# Patient Record
Sex: MALE | Race: WHITE | NOT HISPANIC OR LATINO
[De-identification: names, ages, dates, MRNs, and addresses within clinical notes are randomized per-mention and may not be internally consistent; named-entity substitution may affect disease eponyms.]

---

## 2017-06-26 ENCOUNTER — TRANSCRIPTION ENCOUNTER (OUTPATIENT)
Age: 62
End: 2017-06-26

## 2017-07-11 ENCOUNTER — TRANSCRIPTION ENCOUNTER (OUTPATIENT)
Age: 62
End: 2017-07-11

## 2019-10-09 ENCOUNTER — INBOUND DOCUMENT (OUTPATIENT)
Age: 64
End: 2019-10-09

## 2021-07-05 ENCOUNTER — TRANSCRIPTION ENCOUNTER (OUTPATIENT)
Age: 66
End: 2021-07-05

## 2021-12-09 ENCOUNTER — TRANSCRIPTION ENCOUNTER (OUTPATIENT)
Age: 66
End: 2021-12-09

## 2022-06-06 ENCOUNTER — NON-APPOINTMENT (OUTPATIENT)
Age: 67
End: 2022-06-06

## 2022-08-25 ENCOUNTER — NON-APPOINTMENT (OUTPATIENT)
Age: 67
End: 2022-08-25

## 2022-11-23 ENCOUNTER — OFFICE (OUTPATIENT)
Dept: URBAN - METROPOLITAN AREA CLINIC 114 | Facility: CLINIC | Age: 67
Setting detail: OPHTHALMOLOGY
End: 2022-11-23
Payer: COMMERCIAL

## 2022-11-23 DIAGNOSIS — H25.13: ICD-10-CM

## 2022-11-23 DIAGNOSIS — H40.013: ICD-10-CM

## 2022-11-23 PROCEDURE — 99213 OFFICE O/P EST LOW 20 MIN: CPT | Performed by: SPECIALIST

## 2022-11-23 PROCEDURE — 92020 GONIOSCOPY: CPT | Performed by: SPECIALIST

## 2022-11-23 PROCEDURE — 92083 EXTENDED VISUAL FIELD XM: CPT | Performed by: SPECIALIST

## 2022-11-23 ASSESSMENT — TONOMETRY
OS_IOP_MMHG: 19
OD_IOP_MMHG: 18

## 2022-11-23 ASSESSMENT — PACHYMETRY
OS_CT_UM: 582
OS_CT_CORRECTION: -3
OD_CT_UM: 594
OD_CT_CORRECTION: -4

## 2022-11-23 ASSESSMENT — VISUAL ACUITY
OS_BCVA: 20/20
OD_BCVA: 20/20

## 2022-11-23 ASSESSMENT — CONFRONTATIONAL VISUAL FIELD TEST (CVF)
OD_FINDINGS: FULL
OS_FINDINGS: FULL

## 2023-01-19 ENCOUNTER — INPATIENT (INPATIENT)
Facility: HOSPITAL | Age: 68
LOS: 4 days | Discharge: HOME | DRG: 641 | End: 2023-01-24
Attending: INTERNAL MEDICINE | Admitting: HOSPITALIST
Payer: COMMERCIAL

## 2023-01-19 VITALS
TEMPERATURE: 99 F | DIASTOLIC BLOOD PRESSURE: 75 MMHG | WEIGHT: 164.91 LBS | HEART RATE: 64 BPM | OXYGEN SATURATION: 100 % | SYSTOLIC BLOOD PRESSURE: 133 MMHG | RESPIRATION RATE: 18 BRPM | HEIGHT: 69 IN

## 2023-01-19 DIAGNOSIS — R55 SYNCOPE AND COLLAPSE: ICD-10-CM

## 2023-01-19 LAB
ABO RH CONFIRMATION: SIGNIFICANT CHANGE UP
ADD ON TEST-SPECIMEN IN LAB: SIGNIFICANT CHANGE UP
ALBUMIN SERPL ELPH-MCNC: 3.7 G/DL — SIGNIFICANT CHANGE UP (ref 3.3–5)
ALP SERPL-CCNC: 72 U/L — SIGNIFICANT CHANGE UP (ref 40–120)
ALT FLD-CCNC: 38 U/L — SIGNIFICANT CHANGE UP (ref 12–78)
ANION GAP SERPL CALC-SCNC: 8 MMOL/L — SIGNIFICANT CHANGE UP (ref 5–17)
APPEARANCE UR: ABNORMAL
APTT BLD: 26.1 SEC — LOW (ref 27.5–35.5)
AST SERPL-CCNC: 33 U/L — SIGNIFICANT CHANGE UP (ref 15–37)
BACTERIA # UR AUTO: NEGATIVE — SIGNIFICANT CHANGE UP
BASOPHILS # BLD AUTO: 0.02 K/UL — SIGNIFICANT CHANGE UP (ref 0–0.2)
BASOPHILS NFR BLD AUTO: 0.2 % — SIGNIFICANT CHANGE UP (ref 0–2)
BILIRUB SERPL-MCNC: 0.9 MG/DL — SIGNIFICANT CHANGE UP (ref 0.2–1.2)
BILIRUB UR-MCNC: NEGATIVE — SIGNIFICANT CHANGE UP
BLD GP AB SCN SERPL QL: SIGNIFICANT CHANGE UP
BUN SERPL-MCNC: 28 MG/DL — HIGH (ref 7–23)
CALCIUM SERPL-MCNC: 9.1 MG/DL — SIGNIFICANT CHANGE UP (ref 8.5–10.1)
CHLORIDE SERPL-SCNC: 92 MMOL/L — LOW (ref 96–108)
CO2 SERPL-SCNC: 27 MMOL/L — SIGNIFICANT CHANGE UP (ref 22–31)
COLOR SPEC: ABNORMAL
CREAT SERPL-MCNC: 1.76 MG/DL — HIGH (ref 0.5–1.3)
DIFF PNL FLD: ABNORMAL
EGFR: 42 ML/MIN/1.73M2 — LOW
EOSINOPHIL # BLD AUTO: 0 K/UL — SIGNIFICANT CHANGE UP (ref 0–0.5)
EOSINOPHIL NFR BLD AUTO: 0 % — SIGNIFICANT CHANGE UP (ref 0–6)
EPI CELLS # UR: NEGATIVE — SIGNIFICANT CHANGE UP
FLUAV AG NPH QL: SIGNIFICANT CHANGE UP
FLUBV AG NPH QL: SIGNIFICANT CHANGE UP
GLUCOSE SERPL-MCNC: 239 MG/DL — HIGH (ref 70–99)
GLUCOSE UR QL: NEGATIVE — SIGNIFICANT CHANGE UP
HCT VFR BLD CALC: 35.8 % — LOW (ref 39–50)
HGB BLD-MCNC: 12.6 G/DL — LOW (ref 13–17)
IMM GRANULOCYTES NFR BLD AUTO: 0.5 % — SIGNIFICANT CHANGE UP (ref 0–0.9)
INR BLD: 1.45 RATIO — HIGH (ref 0.88–1.16)
KETONES UR-MCNC: ABNORMAL
LACTATE SERPL-SCNC: 1.5 MMOL/L — SIGNIFICANT CHANGE UP (ref 0.7–2)
LACTATE SERPL-SCNC: 2.2 MMOL/L — HIGH (ref 0.7–2)
LEUKOCYTE ESTERASE UR-ACNC: ABNORMAL
LYMPHOCYTES # BLD AUTO: 0.13 K/UL — LOW (ref 1–3.3)
LYMPHOCYTES # BLD AUTO: 1.2 % — LOW (ref 13–44)
MAGNESIUM SERPL-MCNC: 2 MG/DL — SIGNIFICANT CHANGE UP (ref 1.6–2.6)
MANUAL SMEAR VERIFICATION: SIGNIFICANT CHANGE UP
MCHC RBC-ENTMCNC: 31.2 PG — SIGNIFICANT CHANGE UP (ref 27–34)
MCHC RBC-ENTMCNC: 35.2 GM/DL — SIGNIFICANT CHANGE UP (ref 32–36)
MCV RBC AUTO: 88.6 FL — SIGNIFICANT CHANGE UP (ref 80–100)
MONOCYTES # BLD AUTO: 0.19 K/UL — SIGNIFICANT CHANGE UP (ref 0–0.9)
MONOCYTES NFR BLD AUTO: 1.7 % — LOW (ref 2–14)
NEUTROPHILS # BLD AUTO: 10.58 K/UL — HIGH (ref 1.8–7.4)
NEUTROPHILS NFR BLD AUTO: 96.4 % — HIGH (ref 43–77)
NITRITE UR-MCNC: NEGATIVE — SIGNIFICANT CHANGE UP
NT-PROBNP SERPL-SCNC: 366 PG/ML — HIGH (ref 0–125)
PH UR: 5 — SIGNIFICANT CHANGE UP (ref 5–8)
PLAT MORPH BLD: NORMAL — SIGNIFICANT CHANGE UP
PLATELET # BLD AUTO: 260 K/UL — SIGNIFICANT CHANGE UP (ref 150–400)
POTASSIUM SERPL-MCNC: 4 MMOL/L — SIGNIFICANT CHANGE UP (ref 3.5–5.3)
POTASSIUM SERPL-SCNC: 4 MMOL/L — SIGNIFICANT CHANGE UP (ref 3.5–5.3)
PROT SERPL-MCNC: 7.3 GM/DL — SIGNIFICANT CHANGE UP (ref 6–8.3)
PROT UR-MCNC: 100
PROTHROM AB SERPL-ACNC: 16.9 SEC — HIGH (ref 10.5–13.4)
RBC # BLD: 4.04 M/UL — LOW (ref 4.2–5.8)
RBC # FLD: 11.9 % — SIGNIFICANT CHANGE UP (ref 10.3–14.5)
RBC BLD AUTO: NORMAL — SIGNIFICANT CHANGE UP
RBC CASTS # UR COMP ASSIST: >50 /HPF (ref 0–4)
RSV RNA NPH QL NAA+NON-PROBE: SIGNIFICANT CHANGE UP
SARS-COV-2 RNA SPEC QL NAA+PROBE: SIGNIFICANT CHANGE UP
SODIUM SERPL-SCNC: 127 MMOL/L — LOW (ref 135–145)
SP GR SPEC: 1 — LOW (ref 1.01–1.02)
TROPONIN I, HIGH SENSITIVITY RESULT: 11.98 NG/L — SIGNIFICANT CHANGE UP
TROPONIN I, HIGH SENSITIVITY RESULT: 8.03 NG/L — SIGNIFICANT CHANGE UP
UROBILINOGEN FLD QL: NEGATIVE — SIGNIFICANT CHANGE UP
WBC # BLD: 10.97 K/UL — HIGH (ref 3.8–10.5)
WBC # FLD AUTO: 10.97 K/UL — HIGH (ref 3.8–10.5)
WBC UR QL: SIGNIFICANT CHANGE UP /HPF (ref 0–5)

## 2023-01-19 PROCEDURE — 83605 ASSAY OF LACTIC ACID: CPT

## 2023-01-19 PROCEDURE — 71045 X-RAY EXAM CHEST 1 VIEW: CPT | Mod: 26

## 2023-01-19 PROCEDURE — 87086 URINE CULTURE/COLONY COUNT: CPT

## 2023-01-19 PROCEDURE — 85027 COMPLETE CBC AUTOMATED: CPT

## 2023-01-19 PROCEDURE — 83735 ASSAY OF MAGNESIUM: CPT

## 2023-01-19 PROCEDURE — 80048 BASIC METABOLIC PNL TOTAL CA: CPT

## 2023-01-19 PROCEDURE — 72170 X-RAY EXAM OF PELVIS: CPT | Mod: 26

## 2023-01-19 PROCEDURE — 70486 CT MAXILLOFACIAL W/O DYE: CPT | Mod: 26,MA

## 2023-01-19 PROCEDURE — 70450 CT HEAD/BRAIN W/O DYE: CPT | Mod: 26,MA

## 2023-01-19 PROCEDURE — 86803 HEPATITIS C AB TEST: CPT

## 2023-01-19 PROCEDURE — 99285 EMERGENCY DEPT VISIT HI MDM: CPT

## 2023-01-19 PROCEDURE — 93010 ELECTROCARDIOGRAM REPORT: CPT

## 2023-01-19 PROCEDURE — 84484 ASSAY OF TROPONIN QUANT: CPT

## 2023-01-19 PROCEDURE — 76770 US EXAM ABDO BACK WALL COMP: CPT

## 2023-01-19 PROCEDURE — 36415 COLL VENOUS BLD VENIPUNCTURE: CPT

## 2023-01-19 PROCEDURE — 83036 HEMOGLOBIN GLYCOSYLATED A1C: CPT

## 2023-01-19 PROCEDURE — 81001 URINALYSIS AUTO W/SCOPE: CPT

## 2023-01-19 PROCEDURE — 99223 1ST HOSP IP/OBS HIGH 75: CPT

## 2023-01-19 PROCEDURE — 76376 3D RENDER W/INTRP POSTPROCES: CPT | Mod: 26

## 2023-01-19 PROCEDURE — 72125 CT NECK SPINE W/O DYE: CPT | Mod: 26,MA

## 2023-01-19 RX ORDER — LANOLIN ALCOHOL/MO/W.PET/CERES
3 CREAM (GRAM) TOPICAL AT BEDTIME
Refills: 0 | Status: DISCONTINUED | OUTPATIENT
Start: 2023-01-19 | End: 2023-01-24

## 2023-01-19 RX ORDER — METOPROLOL TARTRATE 50 MG
100 TABLET ORAL
Refills: 0 | Status: DISCONTINUED | OUTPATIENT
Start: 2023-01-19 | End: 2023-01-24

## 2023-01-19 RX ORDER — ONDANSETRON 8 MG/1
4 TABLET, FILM COATED ORAL EVERY 8 HOURS
Refills: 0 | Status: DISCONTINUED | OUTPATIENT
Start: 2023-01-19 | End: 2023-01-24

## 2023-01-19 RX ORDER — SODIUM CHLORIDE 9 MG/ML
1000 INJECTION INTRAMUSCULAR; INTRAVENOUS; SUBCUTANEOUS
Refills: 0 | Status: DISCONTINUED | OUTPATIENT
Start: 2023-01-19 | End: 2023-01-21

## 2023-01-19 RX ORDER — CEFTRIAXONE 500 MG/1
1000 INJECTION, POWDER, FOR SOLUTION INTRAMUSCULAR; INTRAVENOUS ONCE
Refills: 0 | Status: COMPLETED | OUTPATIENT
Start: 2023-01-19 | End: 2023-01-19

## 2023-01-19 RX ORDER — CEFTRIAXONE 500 MG/1
INJECTION, POWDER, FOR SOLUTION INTRAMUSCULAR; INTRAVENOUS
Refills: 0 | Status: DISCONTINUED | OUTPATIENT
Start: 2023-01-19 | End: 2023-01-21

## 2023-01-19 RX ORDER — SODIUM CHLORIDE 9 MG/ML
3 INJECTION INTRAMUSCULAR; INTRAVENOUS; SUBCUTANEOUS ONCE
Refills: 0 | Status: COMPLETED | OUTPATIENT
Start: 2023-01-19 | End: 2023-01-19

## 2023-01-19 RX ORDER — CEFTRIAXONE 500 MG/1
1000 INJECTION, POWDER, FOR SOLUTION INTRAMUSCULAR; INTRAVENOUS EVERY 24 HOURS
Refills: 0 | Status: DISCONTINUED | OUTPATIENT
Start: 2023-01-20 | End: 2023-01-21

## 2023-01-19 RX ORDER — INFLUENZA VIRUS VACCINE 15; 15; 15; 15 UG/.5ML; UG/.5ML; UG/.5ML; UG/.5ML
0.7 SUSPENSION INTRAMUSCULAR ONCE
Refills: 0 | Status: COMPLETED | OUTPATIENT
Start: 2023-01-19 | End: 2023-01-19

## 2023-01-19 RX ORDER — VALSARTAN 80 MG/1
320 TABLET ORAL DAILY
Refills: 0 | Status: DISCONTINUED | OUTPATIENT
Start: 2023-01-20 | End: 2023-01-24

## 2023-01-19 RX ORDER — ACETAMINOPHEN 500 MG
650 TABLET ORAL EVERY 6 HOURS
Refills: 0 | Status: DISCONTINUED | OUTPATIENT
Start: 2023-01-19 | End: 2023-01-24

## 2023-01-19 RX ADMIN — CEFTRIAXONE 1000 MILLIGRAM(S): 500 INJECTION, POWDER, FOR SOLUTION INTRAMUSCULAR; INTRAVENOUS at 22:31

## 2023-01-19 RX ADMIN — SODIUM CHLORIDE 3 MILLILITER(S): 9 INJECTION INTRAMUSCULAR; INTRAVENOUS; SUBCUTANEOUS at 15:34

## 2023-01-19 RX ADMIN — SODIUM CHLORIDE 75 MILLILITER(S): 9 INJECTION INTRAMUSCULAR; INTRAVENOUS; SUBCUTANEOUS at 22:32

## 2023-01-19 NOTE — ED PROVIDER NOTE - CLINICAL SUMMARY MEDICAL DECISION MAKING FREE TEXT BOX
66 y/o M PMHx of afib not on AC med but +AS-81, recently on bactrim DS presents to the ED for dysuria with gross hematuria, UTI dx at  four days ago, BIBA s/p syncopal episode witnessed by wife incurred while walking to  outpatient office. Positive worsening hematuria including passing clots prior to leaving house, +ecchymosis superificial scratches R lateral periorbital area. Plan: NA: CT head face, CT C-spine, CXR, XR pelvis. Syncope workup: EKG, syncope labs, including serial trop, coags, cardiac monitor, fall precautions. Infection workup: cultures including urine and serum lactate. Void trial with postvoid bladder scan. If pt unable to pass urine 3 way desir for CVI. monitor, observe, reassess. Expect tele-admission for syncope with inpatient  consult. 68 y/o M PMHx of afib not on AC med but +AS-81, recently on bactrim DS presents to the ED for dysuria with gross hematuria, UTI dx at  four days ago, BIBA s/p syncopal episode witnessed by wife incurred while walking to  outpatient office. Positive worsening hematuria including passing clots prior to leaving house, +ecchymosis superificial scratches R lateral periorbital area.   Plan: NA: CT head face, CT C-spine, CXR, XR pelvis. Syncope workup: EKG, syncope labs, including serial trop, coags, cardiac monitor, fall precautions. Infection workup: cultures including urine and serum lactate. Void trial with postvoid bladder scan. If pt unable to pass urine 3 way desir for CVI. monitor, observe, reassess. Expect tele-admission for syncope with inpatient  consult.    See Progress Notes for updates on management. 68 y/o M PMHx of AFib not on AC med but +ASA-81, recently on Bactrim DS for dysuria with gross hematuria, UTI dx at  four days ago, BIBA s/p syncopal episode witnessed by wife incurred while walking to  outpatient office. Positive worsening hematuria including passing clots prior to leaving house, +ecchymosis & superficial scratches R lateral periorbital area.   Plan: Neuro Alert: CT head, face, & C-spine. CXR, XR pelvis. Syncope workup: EKG, syncope labs, including serial trop, coags, cardiac monitor, fall precautions. Infection workup: cultures including urine and serum lactate. Void trial with postvoid bladder scan. If pt unable to pass urine 3 way desir for CBI. monitor, observe, reassess. Expect tele-admission for syncope with inpatient  consult.    See Progress Notes for updates on management & case progression.

## 2023-01-19 NOTE — ED ADULT NURSE NOTE - OBJECTIVE STATEMENT
patient BIBEMS s/p syncopal episode. pt reports that he went to his Urologist, got out of his car, felt dizzy and does not remember passing out. +headstrike to right side of head, abrasion noted to right side of head  -blood thinners, +LOC,  MD Munguia called at bedside Neuro Alert called at 2:15 PM, pt denies numbness tingling, HA, Blurry vision, double vision, Pt sent to [facility]

## 2023-01-19 NOTE — ED PROVIDER NOTE - CARDIAC, MLM
Regular rate, rhythm, and normal radial pulses. Regular rate, rhythm, and normal radial pulses.  Normal radial pulse.

## 2023-01-19 NOTE — PATIENT PROFILE ADULT - FALL HARM RISK - HARM RISK INTERVENTIONS

## 2023-01-19 NOTE — ED PROVIDER NOTE - MUSCULOSKELETAL, MLM
Neck non-tender and supple w/o pain. Back, chest wall, pelvis non- tender and stable. MAEx4, no focal extremity deformity, tenderness, or swelling. No external signs of trauma other than abrasion to right periorbital area. Neck non-tender and supple w/o pain. Back, chest wall, pelvis nontender and stable. MAEx4, no focal extremity deformity, tenderness, or swelling.

## 2023-01-19 NOTE — ED ADULT NURSE NOTE - CHIEF COMPLAINT QUOTE
patient BIBEMS s/p syncopal episode. pt reports that he went to his Urologist, got out of his car, felt dizzy and does not remember passing out. +headstrike to right side of head, abrasion noted to right side of head  -blood thinners, +LOC,  MD Munguia called at bedside Neuro Alert called at 2:15 PM, pt denies numbness tingling, HA, Blurry vision, double vision, Pt sent to [facility]

## 2023-01-19 NOTE — H&P ADULT - ASSESSMENT
66 yo male with PMH of HTN, PAF on asa admitted with:    #gross hematuria - possible hemorrhagic cystitis  #acute blood loss anemia  - admit to tele  - IV abx - ceftriaxone  - follow urine culture  - CBI in place  - urology consult  - renal US ordered  - will need eventual cystoscopy   - hold asa  - monitor H/H    #syncope - likely hypovolemic secondary to above  - tele monitoring  - troponins negative x2  - imagining negative for acute pathology    #ZOILA  - unknown baseline Cr  - hold HCTZ  - IV fluids  - continue valsartan for now, if Cr worsening will need to stop    #Hyponatremia  - hold HCTZ  - fluids  - repeat BMP in AM    #HTN  - continue valsartan and metoprolol  - HCTZ held    #DVT prophylaxis  - SCDs    #Advance Directives  - FULL CODE, d/w patient    wife Adams called and updated on plan of care

## 2023-01-19 NOTE — ED PROVIDER NOTE - GASTROINTESTINAL, MLM
Bowel sounds hypoactive, normal pitch. Soft, nontender positive suprapubic mass suspected of bladder, non-tender. Bowel sounds hypoactive, normal pitch. Soft, nontender, positive suprapubic mass suspected of bladder, non-tender.

## 2023-01-19 NOTE — PATIENT PROFILE ADULT - PATIENT REPRESENTATIVE: ( YOU CAN CHOOSE ANY PERSON THAT CAN ASSIST YOU WITH YOUR HEALTH CARE PREFERENCES, DOES NOT HAVE TO BE A SPOUSE, IMMEDIATE FAMILY OR SIGNIFICANT OTHER/PARTNER)
Billing: [id number] (Unlisted special dermatological service or procedure) Billing: [id number] (Unlisted special dermatological service or procedure) declines

## 2023-01-19 NOTE — ED ADULT NURSE NOTE - ED STAT RN HANDOFF DETAILS
Received pt endorsed from Zenobia Holbrook, evaluated for syncopal episode, pt had desir placed by previous RN, with CBI initiated for hematuria, bright red blood, pt in NAD, c/o of some pressure to bladder still, pt TBA, placed on cardiac/resp monitoring

## 2023-01-19 NOTE — ED PROVIDER NOTE - EYES, MLM
PERRL, EOMI. Mild ecchymotic discoloration to the right infraorbital with associated superficial lateral scratch, unable to separate edges of the wound. Right orbital ridge non-tender, no step off deformity. No other clinical evidence of facial injury, PERRL, EOMI. Mild ecchymotic discoloration to the right infraorbital with associated superficial lateral scratch, unable to separate edges of the wound. Right orbital ridge non-tender, no step-off deformity. No other clinical evidence of facial injury,

## 2023-01-19 NOTE — ED PROVIDER NOTE - SKIN, MLM
Skin normal color for race, warm, dry and intact. No evidence of rash. Skin normal color for race, warm, dry and intact. No evidence of rash.  No external signs of trauma other than abrasion to right periorbital area.

## 2023-01-19 NOTE — ED PROVIDER NOTE - OBJECTIVE STATEMENT
68 y/o male with PMHx of A-fib years ago on baby aspirin and HTN on hctz, valsartan, metoprolol BIBEMS to the ED s/p syncopal episode. Pt reports he drove to his urologist appointment today, got out of his car and suddenly felt dizzy, weak, and lightheaded at around 1:25PM. Apparent LOC involved and likely syncope, however does not remember passing out. LOC < 30 seconds, denies any seizure activity.  Pt woke up on the floor of the parking lot, episode witnessed by wife. + Headstrike to right side of the head, endorsing abrasion ro right face periorbital area. Not on AC. Pt denies HA, numbness/tingling, blurry or double vision. History via pt and wife. On daily aspirin. Pt had blood in urine on Monday, was diagnosed with UTI by  bactrim DS b.i.d. Gross hematuria improved subsequently but second urination more bloody with clots passed.   Urology: [place] (Dr. Sorenson)  PCP: Dr. Duarte  Cardiologist: Dr. Mclean, [place] 66 y/o male with PMHx of A-fib years ago on baby aspirin and HTN on hctz, valsartan, metoprolol BIBEMS to the ED s/p syncopal episode. Pt reports he drove to his urologist appointment today, got out of his car and suddenly felt dizzy, weak, and lightheaded at around 1:25PM. Apparent LOC involved and likely syncope, however does not remember passing out. LOC < 30 seconds, + witnessed by wife: denies any seizure activity.  Pt woke up on the floor of the parking lot. + Head strike to right side of the head, endorsing abrasion ro right face/periorbital area. Not on AC. Pt denies HA, numbness/tingling, blurry or double vision. History via pt and wife. On daily aspirin. Pt had blood in urine on Monday, was diagnosed with UTI by UC & started on Bactrim DS b.i.d. Gross hematuria improved subsequently but second urination today + more bloody with clots passed, prompting GI office eval.  Urology: [place] (Dr. Sorenson)  PCP: Dr. Duarte  Cardiologist: Dr. Mclean, [place]

## 2023-01-19 NOTE — ED PROVIDER NOTE - WET READ LAUNCH FT
There are no Wet Read(s) to document.
[id number] [place] [address]  Phone: [phone]  Fax:

## 2023-01-19 NOTE — ED ADULT TRIAGE NOTE - CHIEF COMPLAINT QUOTE
Cardiac
patient BIBEMS s/p syncopal episode. pt reports that he went to his Urologist, got out of his car, felt dizzy and does not remember passing out. +headstrike to right side of head, abrasion noted to right side of head  -blood thinners, +LOC,  MD Munguia called at bedside Neuro Alert called at 2:15 PM, pt denies numbness tingling, HA, Blurry vision, double vision, Pt sent to [facility]

## 2023-01-19 NOTE — H&P ADULT - NSHPPHYSICALEXAM_GEN_ALL_CORE
Vital Signs Last 24 Hrs  T(C): 36.9 (19 Jan 2023 18:11), Max: 37 (19 Jan 2023 14:22)  T(F): 98.5 (19 Jan 2023 18:11), Max: 98.6 (19 Jan 2023 14:22)  HR: 73 (19 Jan 2023 19:24) (62 - 73)  BP: 113/80 (19 Jan 2023 19:24) (113/80 - 133/75)  BP(mean): --  RR: 19 (19 Jan 2023 19:24) (17 - 19)  SpO2: 98% (19 Jan 2023 19:24) (98% - 100%)    Parameters below as of 19 Jan 2023 19:24  Patient On (Oxygen Delivery Method): room air

## 2023-01-19 NOTE — ED PROVIDER NOTE - PROGRESS NOTE DETAILS
Lesly Baker for attending Dr. Munguia: I Sara Baker attest that this documentation has been prepared under the direction and in the presence of Doctor Munguia. JONATHON Munguia MD:  Pt unable to self-void, ED RN inserted 3-camilla Cooper with + clots & dark red hematuria return.  Manual irrigation: much clots removed, still with gross hematuria.  CBI started.  Labs resulted.  Hospitalist called for Tele admission re: syncope --> no answer, phone message left. JONATHON Munguia MD:  Dr. Murray aware of Tele admission, + JESÚS status. Lesly Baker for attending Dr. Munguia: ASA contraindicated due to active blood in urine. C MD Nilda:  Pt unable to self-void, ED RN inserted 3-way Cooper with + clots & dark red hematuria return.  Manual irrigation: much clots removed, still with gross hematuria.  CBI started.  Labs resulted.  Hospitalist called for Tele admission re: syncope --> no answer, phone message left.

## 2023-01-19 NOTE — H&P ADULT - HISTORY OF PRESENT ILLNESS
66 yo male with PMH of HTN, PAF on asa presents to the ED with hematuria and syncope. Pt states for the past week he has noticed blood in his urine. He went to urgent care on Monday and was prescribed bactrim which he was taking. He noticed worsening hematuria so he made an appt with his urologist Dr. Sorenson today. As he arrived to the office and was getting out of the car he felt lightheaded and fell to the ground. It was witnessed by his wife Adams. He did have LOC and head trauma to the right side of his head. No seizure like activity. Denies any CP, SOB, palpitations. He states he did pass out one other time in his life many years ago. He follows cardio Dr. Tatum and last saw him in September with a normal echo and stress test as per patient. Pt denies any dysuria or fevers. No abd pain. He did notice some blood clots earlier in his urine.   In the ED patient was started on a CBI. Urine still dark red but no clots noted in desir.

## 2023-01-19 NOTE — ED PROVIDER NOTE - NEUROLOGICAL, MLM
A&Ox3, CN2-12 intact, normal speech, no focal motor sensory deficits. A&Ox3, CNs 2-12 intact, normal speech, no focal motor sensory deficits.

## 2023-01-20 DIAGNOSIS — R33.8 OTHER RETENTION OF URINE: ICD-10-CM

## 2023-01-20 LAB
A1C WITH ESTIMATED AVERAGE GLUCOSE RESULT: 6.1 % — HIGH (ref 4–5.6)
ANION GAP SERPL CALC-SCNC: 9 MMOL/L — SIGNIFICANT CHANGE UP (ref 5–17)
BUN SERPL-MCNC: 20 MG/DL — SIGNIFICANT CHANGE UP (ref 7–23)
CALCIUM SERPL-MCNC: 8.7 MG/DL — SIGNIFICANT CHANGE UP (ref 8.5–10.1)
CHLORIDE SERPL-SCNC: 95 MMOL/L — LOW (ref 96–108)
CO2 SERPL-SCNC: 26 MMOL/L — SIGNIFICANT CHANGE UP (ref 22–31)
CREAT SERPL-MCNC: 0.95 MG/DL — SIGNIFICANT CHANGE UP (ref 0.5–1.3)
EGFR: 88 ML/MIN/1.73M2 — SIGNIFICANT CHANGE UP
ESTIMATED AVERAGE GLUCOSE: 128 MG/DL — HIGH (ref 68–114)
GLUCOSE SERPL-MCNC: 145 MG/DL — HIGH (ref 70–99)
HCT VFR BLD CALC: 32.5 % — LOW (ref 39–50)
HCV AB S/CO SERPL IA: 0.08 S/CO — SIGNIFICANT CHANGE UP (ref 0–0.99)
HCV AB SERPL-IMP: SIGNIFICANT CHANGE UP
HGB BLD-MCNC: 11.8 G/DL — LOW (ref 13–17)
MCHC RBC-ENTMCNC: 31.3 PG — SIGNIFICANT CHANGE UP (ref 27–34)
MCHC RBC-ENTMCNC: 36.3 GM/DL — HIGH (ref 32–36)
MCV RBC AUTO: 86.2 FL — SIGNIFICANT CHANGE UP (ref 80–100)
PLATELET # BLD AUTO: 215 K/UL — SIGNIFICANT CHANGE UP (ref 150–400)
POTASSIUM SERPL-MCNC: 3.3 MMOL/L — LOW (ref 3.5–5.3)
POTASSIUM SERPL-SCNC: 3.3 MMOL/L — LOW (ref 3.5–5.3)
RBC # BLD: 3.77 M/UL — LOW (ref 4.2–5.8)
RBC # FLD: 12 % — SIGNIFICANT CHANGE UP (ref 10.3–14.5)
SODIUM SERPL-SCNC: 130 MMOL/L — LOW (ref 135–145)
TROPONIN I, HIGH SENSITIVITY RESULT: 10.54 NG/L — SIGNIFICANT CHANGE UP
WBC # BLD: 11.44 K/UL — HIGH (ref 3.8–10.5)
WBC # FLD AUTO: 11.44 K/UL — HIGH (ref 3.8–10.5)

## 2023-01-20 PROCEDURE — 99233 SBSQ HOSP IP/OBS HIGH 50: CPT

## 2023-01-20 PROCEDURE — 76770 US EXAM ABDO BACK WALL COMP: CPT | Mod: 26

## 2023-01-20 RX ORDER — POTASSIUM CHLORIDE 20 MEQ
40 PACKET (EA) ORAL ONCE
Refills: 0 | Status: COMPLETED | OUTPATIENT
Start: 2023-01-20 | End: 2023-01-20

## 2023-01-20 RX ADMIN — Medication 100 MILLIGRAM(S): at 21:04

## 2023-01-20 RX ADMIN — Medication 100 MILLIGRAM(S): at 09:59

## 2023-01-20 RX ADMIN — Medication 40 MILLIEQUIVALENT(S): at 13:09

## 2023-01-20 RX ADMIN — CEFTRIAXONE 1000 MILLIGRAM(S): 500 INJECTION, POWDER, FOR SOLUTION INTRAMUSCULAR; INTRAVENOUS at 21:05

## 2023-01-20 RX ADMIN — VALSARTAN 320 MILLIGRAM(S): 80 TABLET ORAL at 09:59

## 2023-01-20 NOTE — PROGRESS NOTE ADULT - ASSESSMENT
66 yo male with PMH of HTN, PAF on asa presents to the ED with hematuria and syncope.    #gross hematuria, w clot retention, c/b urinary retention, acute blood loss anemia, post renal ZOILA  -monitor Hgb, now stable  -Cr downtrending  -UCx pending  -continue CTX while awaiting UCx  -hold ASA  -CBI per urology, desir/CBI per their recs  -f/u urology recs (Nanette)    #syncope - likely hypovolemic secondary to above  -tele x 24hrs  -orthostatics ordered  -trops neg  -CTH neg    #Hyponatremia  -improving  -trend  -IVF  -hold HCTZ    #lactic acidosis  -improved w IVF    #HTN  - continue valsartan and metoprolol  - HCTZ held    #DVT prophylaxis  - SCDs    #Advance Directives  - FULL CODE, d/w patient    Wife updated at bedside

## 2023-01-20 NOTE — PROGRESS NOTE ADULT - SUBJECTIVE AND OBJECTIVE BOX
HOSPITALIST ATTENDING PROGRESS NOTE    Chart and meds reviewed.  Patient seen and examined.    CC: hematuria    Subjective: Denies abdnl pain, SOB, CP, dizziness.    All other systems reviewed and found to be negative with the exception of what has been described above.    MEDICATIONS  (STANDING):  cefTRIAXone Injectable.      cefTRIAXone Injectable. 1000 milliGRAM(s) IV Push every 24 hours  influenza  Vaccine (HIGH DOSE) 0.7 milliLiter(s) IntraMuscular once  metoprolol tartrate 100 milliGRAM(s) Oral two times a day  sodium chloride 0.9%. 1000 milliLiter(s) (75 mL/Hr) IV Continuous <Continuous>  valsartan 320 milliGRAM(s) Oral daily    MEDICATIONS  (PRN):  acetaminophen     Tablet .. 650 milliGRAM(s) Oral every 6 hours PRN Temp greater or equal to 38C (100.4F), Mild Pain (1 - 3)  aluminum hydroxide/magnesium hydroxide/simethicone Suspension 30 milliLiter(s) Oral every 4 hours PRN Dyspepsia  melatonin 3 milliGRAM(s) Oral at bedtime PRN Insomnia  ondansetron Injectable 4 milliGRAM(s) IV Push every 8 hours PRN Nausea and/or Vomiting      VITALS:  T(F): 99.8 (23 @ 19:45), Max: 99.8 (23 @ 19:45)  HR: 81 (23 @ 19:45) (81 - 98)  BP: 119/71 (23 @ 19:45) (119/71 - 168/75)  RR: 19 (23 @ 19:45) (18 - 19)  SpO2: 98% (23 @ 19:45) (97% - 98%)  Wt(kg): --    I&O's Summary    2023 07:01  -  2023 07:00  --------------------------------------------------------  IN: 5500 mL / OUT: 9100 mL / NET: -3600 mL    2023 07:01  -  2023 21:17  --------------------------------------------------------  IN: 3000 mL / OUT: 3750 mL / NET: -750 mL        CAPILLARY BLOOD GLUCOSE          PHYSICAL EXAM:  Gen: NAD  HEENT:  pupils equal and reactive, EOMI, no oropharyngeal lesions, erythema, exudates, oral thrush  NECK:   supple, no carotid bruits, no palpable lymph nodes, no thyromegaly  CV:  +S1, +S2, regular, no murmurs or rubs  RESP:   lungs clear to auscultation bilaterally, no wheezing, rales, rhonchi, good air entry bilaterally  BREAST:  not examined  GI:  abdomen soft, non-tender, non-distended, normal BS, no bruits, no abdominal masses, no palpable masses  RECTAL:  not examined  :  not examined, desir to CBI  MSK:   normal muscle tone, no atrophy, no rigidity, no contractions  EXT:  no clubbing, no cyanosis, no edema, no calf pain, swelling or erythema  VASCULAR:  pulses equal and symmetric in the upper and lower extremities  NEURO:  AAOX3, no focal neurological deficits, follows all commands, able to move extremities spontaneously  SKIN:  no ulcers, lesions or rashes    LABS:                            11.8   11.44 )-----------( 215      ( 2023 07:27 )             32.5     01-    130<L>  |  95<L>  |  20  ----------------------------<  145<H>  3.3<L>   |  26  |  0.95    Ca    8.7      2023 07:27  Mg     2.0         TPro  7.3  /  Alb  3.7  /  TBili  0.9  /  DBili  x   /  AST  33  /  ALT  38  /  AlkPhos  72          LIVER FUNCTIONS - ( 2023 15:25 )  Alb: 3.7 g/dL / Pro: 7.3 gm/dL / ALK PHOS: 72 U/L / ALT: 38 U/L / AST: 33 U/L / GGT: x           PT/INR - ( 2023 15:25 )   PT: 16.9 sec;   INR: 1.45 ratio         PTT - ( 2023 15:25 )  PTT:26.1 sec  Urinalysis Basic - ( 2023 20:11 )    Color: Red / Appearance: Slightly Turbid / S.005 / pH: x  Gluc: x / Ketone: Trace  / Bili: Negative / Urobili: Negative   Blood: x / Protein: 100 / Nitrite: Negative   Leuk Esterase: Small / RBC: >50 /HPF / WBC 0-2 /HPF   Sq Epi: x / Non Sq Epi: Negative / Bacteria: Negative    CULTURES:  BCx NG  UCx pending    Additional results/Imaging, I have personally reviewed:  CTH, CT cspine, CT facial bones 23: IMPRESSION: 1)  unremarkable CT study of the brain 2)  no intracerebral hemorrhage, contusion, or collections identified. CERVICAL IMPRESSION: No acute cervical fracture identified. Underlying degenerative changes C3-C7. FACIAL BONES IMPRESSION : No acute facial bone fracture identified.    CXR, pelvic xray 23: Negative chest. No pelvic fracture.    Renal bladder u/s 23: No evidence for bilateral hydronephrosis. Limited evaluation of the bladder region.    Telemetry, personally reviewed:  23: sinus 70-80a

## 2023-01-20 NOTE — CONSULT NOTE ADULT - ASSESSMENT
A/P:  66 y/o male with clot retention     Continue CBI for now - Titrate to pink to clear     Irrigate desir PRN     Ck am labs     Cont Crftriaxone     Above discussed with Dr. Fitzpatrick

## 2023-01-20 NOTE — CONSULT NOTE ADULT - SUBJECTIVE AND OBJECTIVE BOX
66 yo male with PMH of HTN, PAF on asa presents to the ED with hematuria and syncope. Pt states for the past week he has noticed blood in his urine. He went to urgent care on Monday and was prescribed bactrim which he was taking. He noticed worsening hematuria so he made an appt with his urologist Dr. Sorenson today. As he arrived to the office and was getting out of the car he felt lightheaded and fell to the ground. It was witnessed by his wife Adams. He did have LOC and head trauma to the right side of his head. No seizure like activity. Denies any CP, SOB, palpitations. He states he did pass out one other time in his life many years ago. He follows cardio Dr. Tatum and last saw him in September with a normal echo and stress test as per patient. Pt denies any dysuria or fevers. No abd pain. He did notice some blood clots earlier in his urine.   In the ED patient was started on a CBI. Urine still dark red but no clots noted in desir.     Called to see pt because of clot retention and CBI not running.      Allergies and Intolerances:        Allergies:  	Allergy Status Unknown:     Home Medications:   * Patient Currently Takes Medications as of 2023 19:50 documented in Structured Notes  · 	sulfamethoxazole-trimethoprim 800 mg-160 mg oral tablet: Last Dose Taken:  , 1 tab(s) orally every 12 hours  · 	hydroCHLOROthiazide 25 mg oral tablet: Last Dose Taken:  , 1 tab(s) orally once a day  · 	metoprolol tartrate 100 mg oral tablet: Last Dose Taken:  , 1 tab(s) orally 2 times a day  · 	valsartan 320 mg oral tablet: Last Dose Taken:  , 1 tab(s) orally once a day    .    Patient History:    Past Medical, Past Surgical, and Family History:  PAST MEDICAL HISTORY:  Atrial fibrillation     HTN (hypertension).     FAMILY HISTORY:  Father  Still living? Unknown  Family history of CVA, Age at diagnosis: Age Unknown    Mother  Still living? Unknown  Family history of CVA, Age at diagnosis: Age Unknown.     Social History:  · Substance use	No  · Social History (marital status, living situation, occupation, and sexual history)	denies tobacco, etoh or drug use     Tobacco Screening:  · Core Measure Site	Yes  · Has the patient used tobacco in the past 30 days?	No    Risk Assessment:    Present on Admission:  Deep Venous Thrombosis	no  Pulmonary Embolus	no     HIV Screening:  · In accordance with [place] State law, we offer every patient who comes to our ED an HIV test. Would you like to be tested today?	Opt out    PE:  66 y/o male resting in bed.    Vital Signs Last 24 Hrs  T(C): 36.7 (2023 07:57), Max: 37.1 (2023 22:20)  T(F): 98 (2023 07:57), Max: 98.8 (2023 22:20)  HR: 98 (2023 07:57) (62 - 98)  BP: 168/75 (2023 07:57) (113/80 - 168/75)  BP(mean): --  RR: 18 (2023 07:57) (17 - 19)  SpO2: 97% (2023 07:57) (97% - 100%)    Parameters below as of 2023 07:57  Patient On (Oxygen Delivery Method): room air    Head: NC/AT, no lesions noted  Neck: Soft/ supple  Heart: RRR, S1S2 normal,  Lungs: CTA bilat, no rales, rhonchi or wheezes  Back: No CVA tenderness  Abd: Soft, tender lower abd< +BS        ? Bladder palp  :  Desir with CBI draining punch colored urine, no clots          Irrigated desir with normal saline and retrieved about 500 punch colored urine with visible clots          Pt feeling better and now CBI running pink to clear on CBI  Lower Ext: No calf tenderness  Skin: Warm and dry  Neuro: Grossly intact      LABS:                          11.8   11.44 )-----------( 215      ( 2023 07:27 )             32.5     01-20    130<L>  |  95<L>  |  20  ----------------------------<  145<H>  3.3<L>   |  26  |  0.95    Ca    8.7      2023 07:27  Mg     2.0         TPro  7.3  /  Alb  3.7  /  TBili  0.9  /  DBili  x   /  AST  33  /  ALT  38  /  AlkPhos  72      LIVER FUNCTIONS - ( 2023 15:25 )  Alb: 3.7 g/dL / Pro: 7.3 gm/dL / ALK PHOS: 72 U/L / ALT: 38 U/L / AST: 33 U/L / GGT: x           PT/INR - ( 2023 15:25 )   PT: 16.9 sec;   INR: 1.45 ratio         PTT - ( 2023 15:25 )  PTT:26.1 sec  Urinalysis Basic - ( 2023 20:11 )    Color: Red / Appearance: Slightly Turbid / S.005 / pH: x  Gluc: x / Ketone: Trace  / Bili: Negative / Urobili: Negative   Blood: x / Protein: 100 / Nitrite: Negative   Leuk Esterase: Small / RBC: >50 /HPF / WBC 0-2 /HPF   Sq Epi: x / Non Sq Epi: Negative / Bacteria: Negative

## 2023-01-21 LAB
ANION GAP SERPL CALC-SCNC: 6 MMOL/L — SIGNIFICANT CHANGE UP (ref 5–17)
BUN SERPL-MCNC: 13 MG/DL — SIGNIFICANT CHANGE UP (ref 7–23)
CALCIUM SERPL-MCNC: 8.1 MG/DL — LOW (ref 8.5–10.1)
CHLORIDE SERPL-SCNC: 101 MMOL/L — SIGNIFICANT CHANGE UP (ref 96–108)
CO2 SERPL-SCNC: 26 MMOL/L — SIGNIFICANT CHANGE UP (ref 22–31)
CREAT SERPL-MCNC: 0.68 MG/DL — SIGNIFICANT CHANGE UP (ref 0.5–1.3)
CULTURE RESULTS: SIGNIFICANT CHANGE UP
EGFR: 102 ML/MIN/1.73M2 — SIGNIFICANT CHANGE UP
GLUCOSE SERPL-MCNC: 128 MG/DL — HIGH (ref 70–99)
HCT VFR BLD CALC: 32.5 % — LOW (ref 39–50)
HGB BLD-MCNC: 11.2 G/DL — LOW (ref 13–17)
MAGNESIUM SERPL-MCNC: 2 MG/DL — SIGNIFICANT CHANGE UP (ref 1.6–2.6)
MCHC RBC-ENTMCNC: 30.9 PG — SIGNIFICANT CHANGE UP (ref 27–34)
MCHC RBC-ENTMCNC: 34.5 GM/DL — SIGNIFICANT CHANGE UP (ref 32–36)
MCV RBC AUTO: 89.5 FL — SIGNIFICANT CHANGE UP (ref 80–100)
PLATELET # BLD AUTO: 195 K/UL — SIGNIFICANT CHANGE UP (ref 150–400)
POTASSIUM SERPL-MCNC: 3.8 MMOL/L — SIGNIFICANT CHANGE UP (ref 3.5–5.3)
POTASSIUM SERPL-SCNC: 3.8 MMOL/L — SIGNIFICANT CHANGE UP (ref 3.5–5.3)
RBC # BLD: 3.63 M/UL — LOW (ref 4.2–5.8)
RBC # FLD: 12 % — SIGNIFICANT CHANGE UP (ref 10.3–14.5)
SODIUM SERPL-SCNC: 133 MMOL/L — LOW (ref 135–145)
SPECIMEN SOURCE: SIGNIFICANT CHANGE UP
WBC # BLD: 7.96 K/UL — SIGNIFICANT CHANGE UP (ref 3.8–10.5)
WBC # FLD AUTO: 7.96 K/UL — SIGNIFICANT CHANGE UP (ref 3.8–10.5)

## 2023-01-21 PROCEDURE — 99233 SBSQ HOSP IP/OBS HIGH 50: CPT

## 2023-01-21 RX ADMIN — Medication 100 MILLIGRAM(S): at 11:43

## 2023-01-21 RX ADMIN — Medication 100 MILLIGRAM(S): at 21:01

## 2023-01-21 RX ADMIN — VALSARTAN 320 MILLIGRAM(S): 80 TABLET ORAL at 11:44

## 2023-01-21 NOTE — PROGRESS NOTE ADULT - ASSESSMENT
68 yo male with PMH of HTN, PAF on asa presents to the ED with hematuria and syncope.    #gross hematuria, w clot retention, c/b urinary retention, acute blood loss anemia, post renal ZOILA  -monitor Hgb, now stable  -Cr downtrending  -UCx <10K, will d/c CTX  -hold ASA  -CBI per urology, to stop today per their recs  -f/u urology recs (Nanette)    #syncope - likely hypovolemic secondary to above  -tele x 24hrs wnl, will d/c  -orthostatics ordered  -trops neg  -CTH neg    #Hyponatremia  -improving  -trend  -s/p IVF  -hold HCTZ    #lactic acidosis  -improved w IVF    #HTN  - continue valsartan and metoprolol  - HCTZ held    #preDM  -A1c 6.1  -pt aware    #DVT prophylaxis  - SCDs    #Advance Directives  - FULL CODE, d/w patient    Wife updated at bedside

## 2023-01-21 NOTE — PROVIDER CONTACT NOTE (OTHER) - SITUATION
CBI d/c today. Desir still attached. Told in report urine was yellow. Upon change of shift desir draining red. pt stated with movement it gets irritated.

## 2023-01-21 NOTE — CHART NOTE - NSCHARTNOTEFT_GEN_A_CORE
notified by nurse for hematuria.     Evalated pt by bedside.   pt no complaints at the moment. no pain/ irritation, no fatigue. alert orientedx4.     desir bag has about 100cc urine, red/ pink in color.   Large volume blood loss not suspected at the moment.    asked nurse to keep monitoring, will follow. notified by nurse for hematuria.     Evalated pt by bedside.   pt no complaints at the moment. no pain/ irritation, no fatigue. alert orientedx4.     desir bag has about 100cc urine, red/ pink in color.   Large volume blood loss not suspected at the moment.    asked nurse to keep monitoring, will follow.      addendum : at 5:40 am notified by nurse for continuous hematuria. CBC stat ordered

## 2023-01-21 NOTE — PROGRESS NOTE ADULT - SUBJECTIVE AND OBJECTIVE BOX
HOSPITALIST ATTENDING PROGRESS NOTE    Chart and meds reviewed.  Patient seen and examined.    CC: gross hematuria    Subjective: Denies CP, SOB, dizziness.    All other systems reviewed and found to be negative with the exception of what has been described above.    MEDICATIONS  (STANDING):  influenza  Vaccine (HIGH DOSE) 0.7 milliLiter(s) IntraMuscular once  metoprolol tartrate 100 milliGRAM(s) Oral two times a day  valsartan 320 milliGRAM(s) Oral daily    MEDICATIONS  (PRN):  acetaminophen     Tablet .. 650 milliGRAM(s) Oral every 6 hours PRN Temp greater or equal to 38C (100.4F), Mild Pain (1 - 3)  aluminum hydroxide/magnesium hydroxide/simethicone Suspension 30 milliLiter(s) Oral every 4 hours PRN Dyspepsia  melatonin 3 milliGRAM(s) Oral at bedtime PRN Insomnia  ondansetron Injectable 4 milliGRAM(s) IV Push every 8 hours PRN Nausea and/or Vomiting      VITALS:  T(F): 98.8 (23 @ 08:13), Max: 99.8 (23 @ 19:45)  HR: 79 (23 @ 08:13) (79 - 81)  BP: 128/63 (23 @ 08:13) (119/71 - 128/63)  RR: 17 (23 @ 08:13) (17 - 19)  SpO2: 98% (23 @ 08:13) (98% - 98%)  Wt(kg): --    I&O's Summary    2023 07:  -  2023 07:00  --------------------------------------------------------  IN: 7000 mL / OUT: 8650 mL / NET: -1650 mL    2023 07:  -  2023 16:11  --------------------------------------------------------  IN: 0 mL / OUT: 2000 mL / NET: -2000 mL        CAPILLARY BLOOD GLUCOSE          PHYSICAL EXAM:  Gen: NAD  HEENT:  pupils equal and reactive, EOMI, no oropharyngeal lesions, erythema, exudates, oral thrush  NECK:   supple, no carotid bruits, no palpable lymph nodes, no thyromegaly  CV:  +S1, +S2, regular, no murmurs or rubs  RESP:   lungs clear to auscultation bilaterally, no wheezing, rales, rhonchi, good air entry bilaterally  BREAST:  not examined  GI:  abdomen soft, non-tender, non-distended, normal BS, no bruits, no abdominal masses, no palpable masses  RECTAL:  not examined  :  not examined, desir to CBI, urine light yellow  MSK:   normal muscle tone, no atrophy, no rigidity, no contractions  EXT:  no clubbing, no cyanosis, no edema, no calf pain, swelling or erythema  VASCULAR:  pulses equal and symmetric in the upper and lower extremities  NEURO:  AAOX3, no focal neurological deficits, follows all commands, able to move extremities spontaneously  SKIN:  no ulcers, lesions or rashes    LABS:                            11.2   7.96  )-----------( 195      ( 2023 07:21 )             32.5     01-21    133<L>  |  101  |  13  ----------------------------<  128<H>  3.8   |  26  |  0.68    Ca    8.1<L>      2023 07:21  Mg     2.0                   Urinalysis Basic - ( 2023 20:11 )    Color: Red / Appearance: Slightly Turbid / S.005 / pH: x  Gluc: x / Ketone: Trace  / Bili: Negative / Urobili: Negative   Blood: x / Protein: 100 / Nitrite: Negative   Leuk Esterase: Small / RBC: >50 /HPF / WBC 0-2 /HPF   Sq Epi: x / Non Sq Epi: Negative / Bacteria: Negative    CULTURES:  BCx NG  UCx <10K    Additional results/Imaging, I have personally reviewed:  CTH, CT cspine, CT facial bones 23: IMPRESSION: 1)  unremarkable CT study of the brain 2)  no intracerebral hemorrhage, contusion, or collections identified. CERVICAL IMPRESSION: No acute cervical fracture identified. Underlying degenerative changes C3-C7. FACIAL BONES IMPRESSION : No acute facial bone fracture identified.    CXR, pelvic xray 23: Negative chest. No pelvic fracture.    Renal bladder u/s 23: No evidence for bilateral hydronephrosis. Limited evaluation of the bladder region.    Telemetry, personally reviewed:  23: sinus 70-80  23: sinus, VPCs, APCs, d/c tele

## 2023-01-22 LAB
ANION GAP SERPL CALC-SCNC: 4 MMOL/L — LOW (ref 5–17)
BUN SERPL-MCNC: 15 MG/DL — SIGNIFICANT CHANGE UP (ref 7–23)
CALCIUM SERPL-MCNC: 8.5 MG/DL — SIGNIFICANT CHANGE UP (ref 8.5–10.1)
CHLORIDE SERPL-SCNC: 102 MMOL/L — SIGNIFICANT CHANGE UP (ref 96–108)
CO2 SERPL-SCNC: 28 MMOL/L — SIGNIFICANT CHANGE UP (ref 22–31)
CREAT SERPL-MCNC: 0.74 MG/DL — SIGNIFICANT CHANGE UP (ref 0.5–1.3)
EGFR: 99 ML/MIN/1.73M2 — SIGNIFICANT CHANGE UP
GLUCOSE SERPL-MCNC: 124 MG/DL — HIGH (ref 70–99)
HCT VFR BLD CALC: 34.3 % — LOW (ref 39–50)
HGB BLD-MCNC: 11.9 G/DL — LOW (ref 13–17)
MCHC RBC-ENTMCNC: 31.4 PG — SIGNIFICANT CHANGE UP (ref 27–34)
MCHC RBC-ENTMCNC: 34.7 GM/DL — SIGNIFICANT CHANGE UP (ref 32–36)
MCV RBC AUTO: 90.5 FL — SIGNIFICANT CHANGE UP (ref 80–100)
PLATELET # BLD AUTO: 203 K/UL — SIGNIFICANT CHANGE UP (ref 150–400)
POTASSIUM SERPL-MCNC: 3.6 MMOL/L — SIGNIFICANT CHANGE UP (ref 3.5–5.3)
POTASSIUM SERPL-SCNC: 3.6 MMOL/L — SIGNIFICANT CHANGE UP (ref 3.5–5.3)
RBC # BLD: 3.79 M/UL — LOW (ref 4.2–5.8)
RBC # FLD: 12.1 % — SIGNIFICANT CHANGE UP (ref 10.3–14.5)
SODIUM SERPL-SCNC: 134 MMOL/L — LOW (ref 135–145)
WBC # BLD: 6.52 K/UL — SIGNIFICANT CHANGE UP (ref 3.8–10.5)
WBC # FLD AUTO: 6.52 K/UL — SIGNIFICANT CHANGE UP (ref 3.8–10.5)

## 2023-01-22 PROCEDURE — 99232 SBSQ HOSP IP/OBS MODERATE 35: CPT

## 2023-01-22 RX ADMIN — Medication 100 MILLIGRAM(S): at 10:09

## 2023-01-22 RX ADMIN — Medication 100 MILLIGRAM(S): at 21:19

## 2023-01-22 RX ADMIN — VALSARTAN 320 MILLIGRAM(S): 80 TABLET ORAL at 10:08

## 2023-01-22 NOTE — PROGRESS NOTE ADULT - SUBJECTIVE AND OBJECTIVE BOX
HOSPITALIST ATTENDING PROGRESS NOTE    Chart and meds reviewed.  Patient seen and examined.    CC: gross hematuria    Subjective: Had passage of clot and some hematuria this am, now resolved. Denies dizziness, SOB.     All other systems reviewed and found to be negative with the exception of what has been described above.    MEDICATIONS  (STANDING):  influenza  Vaccine (HIGH DOSE) 0.7 milliLiter(s) IntraMuscular once  metoprolol tartrate 100 milliGRAM(s) Oral two times a day  valsartan 320 milliGRAM(s) Oral daily    MEDICATIONS  (PRN):  acetaminophen     Tablet .. 650 milliGRAM(s) Oral every 6 hours PRN Temp greater or equal to 38C (100.4F), Mild Pain (1 - 3)  aluminum hydroxide/magnesium hydroxide/simethicone Suspension 30 milliLiter(s) Oral every 4 hours PRN Dyspepsia  melatonin 3 milliGRAM(s) Oral at bedtime PRN Insomnia  ondansetron Injectable 4 milliGRAM(s) IV Push every 8 hours PRN Nausea and/or Vomiting      VITALS:  T(F): 98.8 (01-22-23 @ 08:59), Max: 99.6 (01-21-23 @ 17:08)  HR: 64 (01-22-23 @ 12:47) (46 - 87)  BP: 121/68 (01-22-23 @ 12:47) (105/65 - 137/69)  RR: 18 (01-22-23 @ 08:59) (18 - 18)  SpO2: 100% (01-22-23 @ 08:59) (98% - 100%)  Wt(kg): --    I&O's Summary    21 Jan 2023 07:01  -  22 Jan 2023 07:00  --------------------------------------------------------  IN: 0 mL / OUT: 3450 mL / NET: -3450 mL    22 Jan 2023 07:01  -  22 Jan 2023 13:40  --------------------------------------------------------  IN: 0 mL / OUT: 1250 mL / NET: -1250 mL        CAPILLARY BLOOD GLUCOSE          PHYSICAL EXAM:  Gen: NAD  HEENT:  pupils equal and reactive, EOMI, no oropharyngeal lesions, erythema, exudates, oral thrush  NECK:   supple, no carotid bruits, no palpable lymph nodes, no thyromegaly  CV:  +S1, +S2, regular, no murmurs or rubs  RESP:   lungs clear to auscultation bilaterally, no wheezing, rales, rhonchi, good air entry bilaterally  BREAST:  not examined  GI:  abdomen soft, non-tender, non-distended, normal BS, no bruits, no abdominal masses, no palpable masses  RECTAL:  not examined  :  not examined, + desir  MSK:   normal muscle tone, no atrophy, no rigidity, no contractions  EXT:  no clubbing, no cyanosis, no edema, no calf pain, swelling or erythema  VASCULAR:  pulses equal and symmetric in the upper and lower extremities  NEURO:  AAOX3, no focal neurological deficits, follows all commands, able to move extremities spontaneously  SKIN:  no ulcers, lesions or rashes    LABS:                            11.9   6.52  )-----------( 203      ( 22 Jan 2023 08:16 )             34.3     01-22    134<L>  |  102  |  15  ----------------------------<  124<H>  3.6   |  28  |  0.74    Ca    8.5      22 Jan 2023 08:16  Mg     2.0     01-21    CULTURES:  BCx NG  UCx <10K    Additional results/Imaging, I have personally reviewed:  CTH, CT cspine, CT facial bones 1/19/23: IMPRESSION: 1)  unremarkable CT study of the brain 2)  no intracerebral hemorrhage, contusion, or collections identified. CERVICAL IMPRESSION: No acute cervical fracture identified. Underlying degenerative changes C3-C7. FACIAL BONES IMPRESSION : No acute facial bone fracture identified.    CXR, pelvic xray 1/19/23: Negative chest. No pelvic fracture.    Renal bladder u/s 1/20/23: No evidence for bilateral hydronephrosis. Limited evaluation of the bladder region.    Telemetry, personally reviewed:  1/20/23: sinus 70-80  1/21/23: sinus, VPCs, APCs, d/c tele

## 2023-01-22 NOTE — PROGRESS NOTE ADULT - ASSESSMENT
A/P:  66 y/o male with hematuria CBI stopped.     Resume CBI for now - Titrate to pink to clear     Irrigate desir PRN     Above discussed with Dr. Fitzpatrick

## 2023-01-22 NOTE — PROGRESS NOTE ADULT - SUBJECTIVE AND OBJECTIVE BOX
Patient is a 67y old  Male who presents with a chief complaint of gross hematuria. In the ED patient was started on a CBI. CBI Clamped yesterday.  RN Called to see if pt need to resume CBI. Urine color still cherry with small clots and sediments.       OBJECTIVE:   T(C): 37.1 (01-22-23 @ 08:59), Max: 37.6 (01-21-23 @ 17:08)  HR: 64 (01-22-23 @ 12:47) (46 - 87)  BP: 121/68 (01-22-23 @ 12:47) (105/65 - 137/69)  RR: 18 (01-22-23 @ 08:59) (18 - 18)  SpO2: 100% (01-22-23 @ 08:59) (98% - 100%)  Wt(kg): --  Daily     Daily                            11.9   6.52  )-----------( 203      ( 22 Jan 2023 08:16 )             34.3     01-22    134<L>  |  102  |  15  ----------------------------<  124<H>  3.6   |  28  |  0.74    Ca    8.5      22 Jan 2023 08:16  Mg     2.0     01-21      Current medications:  acetaminophen     Tablet .. 650 milliGRAM(s) Oral every 6 hours PRN  aluminum hydroxide/magnesium hydroxide/simethicone Suspension 30 milliLiter(s) Oral every 4 hours PRN  influenza  Vaccine (HIGH DOSE) 0.7 milliLiter(s) IntraMuscular once  melatonin 3 milliGRAM(s) Oral at bedtime PRN  metoprolol tartrate 100 milliGRAM(s) Oral two times a day  ondansetron Injectable 4 milliGRAM(s) IV Push every 8 hours PRN  valsartan 320 milliGRAM(s) Oral daily    PE:  Gen: NAD  Head: NC/AT, no lesions noted  Neck: Soft/ supple  Heart: RRR, S1S2 normal,  Lungs: CTA bilat, no rales, rhonchi or wheezes  Back: No CVA tenderness  Abd: Soft, tender lower abd< +BS  :  Desir with CBI Stopped draining cherry colored urine, some small clots in bag, desir patent.  Lower Ext: No calf tenderness  Skin: Warm and dry  Neuro: Grossly intact

## 2023-01-22 NOTE — PROVIDER CONTACT NOTE (OTHER) - ACTION/TREATMENT ORDERED:
MD called and aware. Saw patient. No new orders. Continue to monitor.
Pt not in pain. pt does not have discomfort. MD made aware. H/H ordered for AM. No new orders. Will continue to monitor.

## 2023-01-22 NOTE — PROGRESS NOTE ADULT - ASSESSMENT
66 yo male with PMH of HTN, PAF on asa presents to the ED with hematuria and syncope.    #gross hematuria, w clot retention, c/b urinary retention, acute blood loss anemia, post renal ZOILA  -monitor Hgb, now stable  -Cr downtrending  -UCx <10K, d/c CTX  -hold ASA  -CBI stopped 1/21, some hematuria this am, but appears resolved, will watch overnight to ensure resolution  -f/u urology recs (Nanette)    #syncope - likely hypovolemic secondary to above  -tele x 24hrs wnl, d/c'd  -orthostatics neg  -trops neg  -CTH neg    #Hyponatremia  -improving  -trend  -s/p IVF  -hold HCTZ    #lactic acidosis  -improved w IVF    #HTN  - continue valsartan and metoprolol  - HCTZ held    #preDM  -A1c 6.1  -pt aware    #DVT prophylaxis  - SCDs    #Advance Directives  - FULL CODE, d/w patient    Wife updated at bedside today  Anticipate d/c 1/23 if hematuria remains resolved, will need outpt urology f/u, d/c w karlee

## 2023-01-23 ENCOUNTER — TRANSCRIPTION ENCOUNTER (OUTPATIENT)
Age: 68
End: 2023-01-23

## 2023-01-23 LAB
ANION GAP SERPL CALC-SCNC: 4 MMOL/L — LOW (ref 5–17)
BUN SERPL-MCNC: 15 MG/DL — SIGNIFICANT CHANGE UP (ref 7–23)
CALCIUM SERPL-MCNC: 8.4 MG/DL — LOW (ref 8.5–10.1)
CHLORIDE SERPL-SCNC: 103 MMOL/L — SIGNIFICANT CHANGE UP (ref 96–108)
CO2 SERPL-SCNC: 29 MMOL/L — SIGNIFICANT CHANGE UP (ref 22–31)
CREAT SERPL-MCNC: 0.67 MG/DL — SIGNIFICANT CHANGE UP (ref 0.5–1.3)
EGFR: 102 ML/MIN/1.73M2 — SIGNIFICANT CHANGE UP
GLUCOSE SERPL-MCNC: 124 MG/DL — HIGH (ref 70–99)
HCT VFR BLD CALC: 32.6 % — LOW (ref 39–50)
HGB BLD-MCNC: 11.5 G/DL — LOW (ref 13–17)
MCHC RBC-ENTMCNC: 31.6 PG — SIGNIFICANT CHANGE UP (ref 27–34)
MCHC RBC-ENTMCNC: 35.3 GM/DL — SIGNIFICANT CHANGE UP (ref 32–36)
MCV RBC AUTO: 89.6 FL — SIGNIFICANT CHANGE UP (ref 80–100)
PLATELET # BLD AUTO: 198 K/UL — SIGNIFICANT CHANGE UP (ref 150–400)
POTASSIUM SERPL-MCNC: 3.8 MMOL/L — SIGNIFICANT CHANGE UP (ref 3.5–5.3)
POTASSIUM SERPL-SCNC: 3.8 MMOL/L — SIGNIFICANT CHANGE UP (ref 3.5–5.3)
RBC # BLD: 3.64 M/UL — LOW (ref 4.2–5.8)
RBC # FLD: 12 % — SIGNIFICANT CHANGE UP (ref 10.3–14.5)
SODIUM SERPL-SCNC: 136 MMOL/L — SIGNIFICANT CHANGE UP (ref 135–145)
WBC # BLD: 7.38 K/UL — SIGNIFICANT CHANGE UP (ref 3.8–10.5)
WBC # FLD AUTO: 7.38 K/UL — SIGNIFICANT CHANGE UP (ref 3.8–10.5)

## 2023-01-23 PROCEDURE — 99232 SBSQ HOSP IP/OBS MODERATE 35: CPT

## 2023-01-23 RX ORDER — ACETAMINOPHEN 500 MG
2 TABLET ORAL
Qty: 0 | Refills: 0 | DISCHARGE
Start: 2023-01-23

## 2023-01-23 RX ORDER — FINASTERIDE 5 MG/1
5 TABLET, FILM COATED ORAL DAILY
Refills: 0 | Status: DISCONTINUED | OUTPATIENT
Start: 2023-01-23 | End: 2023-01-24

## 2023-01-23 RX ORDER — HYDROCHLOROTHIAZIDE 25 MG
1 TABLET ORAL
Qty: 0 | Refills: 0 | DISCHARGE

## 2023-01-23 RX ORDER — TAMSULOSIN HYDROCHLORIDE 0.4 MG/1
0.4 CAPSULE ORAL AT BEDTIME
Refills: 0 | Status: DISCONTINUED | OUTPATIENT
Start: 2023-01-23 | End: 2023-01-24

## 2023-01-23 RX ADMIN — Medication 100 MILLIGRAM(S): at 21:47

## 2023-01-23 RX ADMIN — Medication 100 MILLIGRAM(S): at 10:08

## 2023-01-23 RX ADMIN — VALSARTAN 320 MILLIGRAM(S): 80 TABLET ORAL at 10:07

## 2023-01-23 RX ADMIN — FINASTERIDE 5 MILLIGRAM(S): 5 TABLET, FILM COATED ORAL at 18:26

## 2023-01-23 RX ADMIN — TAMSULOSIN HYDROCHLORIDE 0.4 MILLIGRAM(S): 0.4 CAPSULE ORAL at 21:47

## 2023-01-23 NOTE — PROGRESS NOTE ADULT - SUBJECTIVE AND OBJECTIVE BOX
HOSPITALIST ATTENDING PROGRESS NOTE    Chart and meds reviewed.  Patient seen and examined.    CC: gross hematuria    1/23/23- urine slightly pinkish in desir bag, no clots. CBI clamped. Patient feels good and would like to go home today    All other systems reviewed and found to be negative with the exception of what has been described above.    Vital Signs Last 24 Hrs  T(C): 36.7 (23 Jan 2023 08:15), Max: 36.7 (22 Jan 2023 20:10)  T(F): 98 (23 Jan 2023 08:15), Max: 98.1 (22 Jan 2023 20:10)  HR: 65 (23 Jan 2023 10:02) (63 - 68)  BP: 128/76 (23 Jan 2023 10:02) (105/59 - 128/76)  BP(mean): --  RR: 16 (23 Jan 2023 08:15) (16 - 18)  SpO2: 100% (23 Jan 2023 08:15) (99% - 100%)    Parameters below as of 23 Jan 2023 08:15  Patient On (Oxygen Delivery Method): room air    PHYSICAL EXAM:  Gen: NAD  HEENT:  pupils equal and reactive, EOMI, no oropharyngeal lesions, erythema, exudates, oral thrush  NECK:   supple, no carotid bruits, no palpable lymph nodes, no thyromegaly  CV:  +S1, +S2, regular, no murmurs or rubs  RESP:   lungs clear to auscultation bilaterally, no wheezing, rales, rhonchi, good air entry bilaterally  BREAST:  not examined  GI:  abdomen soft, non-tender, non-distended, normal BS, no bruits, no abdominal masses, no palpable masses  RECTAL:  not examined  :  not examined, + desir  MSK:   normal muscle tone, no atrophy, no rigidity, no contractions  EXT:  no clubbing, no cyanosis, no edema, no calf pain, swelling or erythema  VASCULAR:  pulses equal and symmetric in the upper and lower extremities  NEURO:  AAOX3, no focal neurological deficits, follows all commands, able to move extremities spontaneously  SKIN:  no ulcers, lesions or rashes    LABS:                        11.5   7.38  )-----------( 198      ( 23 Jan 2023 08:24 )             32.6     23 Jan 2023 08:24    136    |  103    |  15     ----------------------------<  124    3.8     |  29     |  0.67     Ca    8.4        23 Jan 2023 08:24    MEDICATIONS  (STANDING):  influenza  Vaccine (HIGH DOSE) 0.7 milliLiter(s) IntraMuscular once  metoprolol tartrate 100 milliGRAM(s) Oral two times a day  valsartan 320 milliGRAM(s) Oral daily    MEDICATIONS  (PRN):  acetaminophen     Tablet .. 650 milliGRAM(s) Oral every 6 hours PRN Temp greater or equal to 38C (100.4F), Mild Pain (1 - 3)  aluminum hydroxide/magnesium hydroxide/simethicone Suspension 30 milliLiter(s) Oral every 4 hours PRN Dyspepsia  melatonin 3 milliGRAM(s) Oral at bedtime PRN Insomnia  ondansetron Injectable 4 milliGRAM(s) IV Push every 8 hours PRN Nausea and/or Vomiting    Assessment/Plan:  #Gross hematuria  Urology following  Hematuria clearing up  CBI clamped  D/w urology- will DC CBI and remove desir for voiding trial  Can f/u as outpatient with urology    #ZOILA postrenal due to urinary retention  Resolved    #Syncope likely hypovolemic  Off telemetry    #Hyponatremia- resolved    #Lactic acidosis- resolved    #HTN- resume all home meds on discharge    #Pre-diabetes- outpatient PCP f/u    #Dispo- TOV then likely home today. DC time 45 mins               HOSPITALIST ATTENDING PROGRESS NOTE    Chart and meds reviewed.  Patient seen and examined.    CC: gross hematuria    1/23/23- urine slightly pinkish in desir bag, no clots. CBI clamped. Patient feels good and would like to go home today    All other systems reviewed and found to be negative with the exception of what has been described above.    Vital Signs Last 24 Hrs  T(C): 36.7 (23 Jan 2023 08:15), Max: 36.7 (22 Jan 2023 20:10)  T(F): 98 (23 Jan 2023 08:15), Max: 98.1 (22 Jan 2023 20:10)  HR: 65 (23 Jan 2023 10:02) (63 - 68)  BP: 128/76 (23 Jan 2023 10:02) (105/59 - 128/76)  BP(mean): --  RR: 16 (23 Jan 2023 08:15) (16 - 18)  SpO2: 100% (23 Jan 2023 08:15) (99% - 100%)    Parameters below as of 23 Jan 2023 08:15  Patient On (Oxygen Delivery Method): room air    PHYSICAL EXAM:  Gen: NAD  HEENT:  pupils equal and reactive, EOMI, no oropharyngeal lesions, erythema, exudates, oral thrush  NECK:   supple, no carotid bruits, no palpable lymph nodes, no thyromegaly  CV:  +S1, +S2, regular, no murmurs or rubs  RESP:   lungs clear to auscultation bilaterally, no wheezing, rales, rhonchi, good air entry bilaterally  BREAST:  not examined  GI:  abdomen soft, non-tender, non-distended, normal BS, no bruits, no abdominal masses, no palpable masses  RECTAL:  not examined  :  not examined, + desir  MSK:   normal muscle tone, no atrophy, no rigidity, no contractions  EXT:  no clubbing, no cyanosis, no edema, no calf pain, swelling or erythema  VASCULAR:  pulses equal and symmetric in the upper and lower extremities  NEURO:  AAOX3, no focal neurological deficits, follows all commands, able to move extremities spontaneously  SKIN:  no ulcers, lesions or rashes    LABS:                        11.5   7.38  )-----------( 198      ( 23 Jan 2023 08:24 )             32.6     23 Jan 2023 08:24    136    |  103    |  15     ----------------------------<  124    3.8     |  29     |  0.67     Ca    8.4        23 Jan 2023 08:24    MEDICATIONS  (STANDING):  influenza  Vaccine (HIGH DOSE) 0.7 milliLiter(s) IntraMuscular once  metoprolol tartrate 100 milliGRAM(s) Oral two times a day  valsartan 320 milliGRAM(s) Oral daily    MEDICATIONS  (PRN):  acetaminophen     Tablet .. 650 milliGRAM(s) Oral every 6 hours PRN Temp greater or equal to 38C (100.4F), Mild Pain (1 - 3)  aluminum hydroxide/magnesium hydroxide/simethicone Suspension 30 milliLiter(s) Oral every 4 hours PRN Dyspepsia  melatonin 3 milliGRAM(s) Oral at bedtime PRN Insomnia  ondansetron Injectable 4 milliGRAM(s) IV Push every 8 hours PRN Nausea and/or Vomiting    Assessment/Plan:  #Gross hematuria/ Mild anemia acute blood loss  Urology following  Hematuria clearing up  HH stable  CBI clamped  D/w urology- will DC CBI and remove desir for voiding trial  Can f/u as outpatient with urology    #ZOILA postrenal due to urinary retention  Resolved    #Syncope likely hypovolemic  Off telemetry    #Hyponatremia- resolved    #Lactic acidosis- resolved    #HTN- resume all home meds on discharge    #Pre-diabetes- outpatient PCP f/u    #Dispo- TOV then likely home today. DC time 45 mins

## 2023-01-23 NOTE — PROGRESS NOTE ADULT - SUBJECTIVE AND OBJECTIVE BOX
Patient seen at bedside. Reports he is feeling better. He denies any abd/flank pain, reports his urine has been clear.    PE  General: No distress, No anxiety  VITALS  T(C): 36.7 (01-23-23 @ 08:15), Max: 36.7 (01-22-23 @ 20:10)  HR: 65 (01-23-23 @ 10:02) (63 - 68)  BP: 128/76 (01-23-23 @ 10:02) (105/59 - 128/76)  RR: 16 (01-23-23 @ 08:15) (16 - 18)  SpO2: 100% (01-23-23 @ 08:15) (99% - 100%)            Skin     : No jaundice   HEENT: Normocephalic, no icterus , EOM full , No epistaxis  Lung    : No resp distress  Abdo:   : Soft, Non tender, No guarding, No distension   Back    : No CVAT b/l  Extremity: No calf tenderness   Genitalia Male: Cooper with CBI on slow drip with clear orange tinge urine  Neuro   : A&Ox3    LABS                        11.5   7.38  )-----------( 198      ( 23 Jan 2023 08:24 )             32.6   01-23    136  |  103  |  15  ----------------------------<  124<H>  3.8   |  29  |  0.67    Ca    8.4<L>      23 Jan 2023 08:24

## 2023-01-23 NOTE — DISCHARGE NOTE PROVIDER - NSDCACTIVITY_GEN_ALL_CORE
Showering allowed/Stairs allowed/Walking - Indoors allowed/Walking - Outdoors allowed No restrictions/Showering allowed/Stairs allowed/Walking - Indoors allowed/Walking - Outdoors allowed

## 2023-01-23 NOTE — DISCHARGE NOTE PROVIDER - NSDCMRMEDTOKEN_GEN_ALL_CORE_FT
acetaminophen 325 mg oral tablet: 2 tab(s) orally every 6 hours, As needed, Temp greater or equal to 38C (100.4F), Mild Pain (1 - 3)  metoprolol tartrate 100 mg oral tablet: 1 tab(s) orally 2 times a day  valsartan 320 mg oral tablet: 1 tab(s) orally once a day   acetaminophen 325 mg oral tablet: 2 tab(s) orally every 6 hours, As needed, Temp greater or equal to 38C (100.4F), Mild Pain (1 - 3)  finasteride 5 mg oral tablet: 1 tab(s) orally once a day  metoprolol tartrate 100 mg oral tablet: 1 tab(s) orally 2 times a day  tamsulosin 0.4 mg oral capsule: 1 cap(s) orally once a day (at bedtime)  valsartan 320 mg oral tablet: 1 tab(s) orally once a day

## 2023-01-23 NOTE — DISCHARGE NOTE PROVIDER - HOSPITAL COURSE
Patient admitted for syncope and gross hematuria. No acute events on telemetry. Placed on CBI for hematuria, urology following. Improved, hematuria cleared up and CBI discontinued. Cooper removed for TOV. Stable for discharge home for outpatient urology f/u 66 yo male with PMH of HTN, PAF on asa presents to the ED with hematuria and syncope. Pt states for the past week he has noticed blood in his urine. He went to urgent care on Monday and was prescribed bactrim which he was taking. He noticed worsening hematuria so he made an appt with his urologist Dr. Sorenson.. As he arrived to the office and was getting out of the car he felt lightheaded and fell to the ground. It was witnessed by his wife Adams. He did have LOC and head trauma to the right side of his head. No seizure like activity. Denies any CP, SOB, palpitations. He states he did pass out one other time in his life many years ago. He follows cardio Dr. Tatum and last saw him in September with a normal echo and stress test as per patient. Pt denies any dysuria or fevers. No abd pain. He did notice some blood clots earlier in his urine.   In the ED patient was started on a CBI. He was admitted for further mx. Tele negative for significant arrhythmia. troponins were negative. Syncope was felt to be related to hypovolemia.   Hyponatremia/glynn improved with holding hctz and administration of ivf.   Hematuria has since cleared. Desir was removed but he failed tov, therefore pt will now be discharged with desir. He has been started on flomax/proscar.  He will f/u with urology as outpt for tov in office.   he is medically stable for dc .     Vital Signs Last 24 Hrs  T(C): 36.6 (24 Jan 2023 08:53), Max: 37.1 (23 Jan 2023 20:16)  T(F): 97.9 (24 Jan 2023 08:53), Max: 98.7 (23 Jan 2023 20:16)  HR: 78 (24 Jan 2023 08:53) (72 - 95)  BP: 153/74 (24 Jan 2023 08:53) (122/69 - 153/74)  BP(mean): 89 (23 Jan 2023 20:16) (89 - 89)  RR: 17 (24 Jan 2023 08:53) (17 - 17)  SpO2: 99% (24 Jan 2023 08:53) (99% - 100%)    Parameters below as of 24 Jan 2023 08:53  Patient On (Oxygen Delivery Method): room air      PHYSICAL EXAM:    GENERAL: Comfortable, no acute distress   HEAD:  Normocephalic, atraumatic  EYES: EOMI, PERRLA  HEENT: Moist mucous membranes  NECK: Supple, No JVD  NERVOUS SYSTEM:  Alert & Oriented X3, Motor Strength 5/5 B/L upper and lower extremities  CHEST/LUNG: Clear to auscultation bilaterally  HEART: Regular rate and rhythm  ABDOMEN: Soft, Nontender, Nondistended, Bowel sounds present  GENITOURINARY: Desir with clear urine.  EXTREMITIES:   No clubbing, cyanosis, or edema  MUSCULOSKELETAL- No muscle tenderness, no joint tenderness  SKIN-no rash        LABS:                        11.5   7.38  )-----------( 198      ( 23 Jan 2023 08:24 )             32.6     01-23    136  |  103  |  15  ----------------------------<  124<H>  3.8   |  29  |  0.67    Ca    8.4<L>      23 Jan 2023 08:24    < from: US Kidney and Bladder (01.20.23 @ 19:37) >    Right kidney: 11.3 cm. No renal mass, hydronephrosis or calculi.    Left kidney: 13.1 cm. No renal mass, hydronephrosis or calculi.    Urinary bladder: Desir catheter within the bladder lumen precluding   assessment for bladder wall thickening. Heterogeneous echotexture is   identified inferior to the location of the indwelling Desir catheter   which may be representative of a markedly enlarged prostate.   Cross-sectional CT imaging can be performed for further characterization.    IMPRESSION:  No evidence for bilateral hydronephrosis. Limited evaluation of the   bladder region, as described above.    CT Head No Cont (01.19.23 @ 14:35) >  BRAIN  IMPRESSION:  1)  unremarkable CT study of the brain  2)  no intracerebral hemorrhage, contusion, or collections identified.  CERVICAL IMPRESSION:  No acute cervical fracture identified. Underlying degenerative changes   C3-C7.  FACIAL BONES IMPRESSION :  No acute facial bone fracture identified.    FINAL DIAGNOSIS:  1. Gross hematuria, s/p CBI now improved.   2. Urinary retention.   3. Acute blood loss anemia d/t hematuira.   4. Pre and post renal glynn improved.   5. hyponatremia  6. Lactic acidosis-resolved.  7. htn.   8. Prediabetes.     time taken for dc 45 min  d/w pt and urology PA  summary to be faxed to pcp.

## 2023-01-23 NOTE — PROGRESS NOTE ADULT - ASSESSMENT
66 y/o male with hematuria, clear on slow drip CBI.  - CBI clamped, if urine remains clear off CBI then perform trial of void prior to discharge  - Pt can follow up with Urologists in [place] (Dr. Collazo/Juan Jose/Matthew) or Follow up with Dr. Inge Fitzpatrick (located in [facility] [phone]) for further management    Case discussed with Dr. Fitzpatrick

## 2023-01-23 NOTE — DISCHARGE NOTE PROVIDER - CARE PROVIDER_API CALL
Alex Ingram)  Urology  [address], [address] [place] [address]  Phone: [phone]  Fax: [phone]  Follow Up Time: 2 weeks

## 2023-01-23 NOTE — CHART NOTE - NSCHARTNOTEFT_GEN_A_CORE
Cooper removed for voiding trial  Patient is partially voiding with some residual in the bladder  Start flomax/ proscar  Hold the discharge and monitor overnight

## 2023-01-23 NOTE — DISCHARGE NOTE PROVIDER - NSDCCPCAREPLAN_GEN_ALL_CORE_FT
PRINCIPAL DISCHARGE DIAGNOSIS  Diagnosis: Syncope  Assessment and Plan of Treatment: likely due to hypovolemia      SECONDARY DISCHARGE DIAGNOSES  Diagnosis: Gross hematuria  Assessment and Plan of Treatment: S/p CBI  Outpatient urology f/u     PRINCIPAL DISCHARGE DIAGNOSIS  Diagnosis: Syncope  Assessment and Plan of Treatment: likely due to hypovolemia  stay hydrated.   you were taken off hctz for bp.   follow up with your pcp for further bp control.      SECONDARY DISCHARGE DIAGNOSES  Diagnosis: Gross hematuria  Assessment and Plan of Treatment: S/p CBI  desir in place.  Outpatient urology f/u

## 2023-01-24 ENCOUNTER — TRANSCRIPTION ENCOUNTER (OUTPATIENT)
Age: 68
End: 2023-01-24

## 2023-01-24 VITALS
DIASTOLIC BLOOD PRESSURE: 74 MMHG | SYSTOLIC BLOOD PRESSURE: 153 MMHG | TEMPERATURE: 98 F | RESPIRATION RATE: 17 BRPM | OXYGEN SATURATION: 99 % | HEART RATE: 78 BPM

## 2023-01-24 LAB
CULTURE RESULTS: SIGNIFICANT CHANGE UP
CULTURE RESULTS: SIGNIFICANT CHANGE UP
SPECIMEN SOURCE: SIGNIFICANT CHANGE UP
SPECIMEN SOURCE: SIGNIFICANT CHANGE UP

## 2023-01-24 PROCEDURE — 99239 HOSP IP/OBS DSCHRG MGMT >30: CPT

## 2023-01-24 RX ORDER — TAMSULOSIN HYDROCHLORIDE 0.4 MG/1
1 CAPSULE ORAL
Qty: 30 | Refills: 0
Start: 2023-01-24

## 2023-01-24 RX ORDER — FINASTERIDE 5 MG/1
1 TABLET, FILM COATED ORAL
Qty: 30 | Refills: 0
Start: 2023-01-24

## 2023-01-24 RX ADMIN — FINASTERIDE 5 MILLIGRAM(S): 5 TABLET, FILM COATED ORAL at 10:30

## 2023-01-24 RX ADMIN — VALSARTAN 320 MILLIGRAM(S): 80 TABLET ORAL at 10:29

## 2023-01-24 RX ADMIN — Medication 100 MILLIGRAM(S): at 10:30

## 2023-01-24 NOTE — PROGRESS NOTE ADULT - SUBJECTIVE AND OBJECTIVE BOX
Patient seen at bedside, patient's hematuria has resolved reports his urine is yellow but he was unable to void and reports he was "trickling" and felt distended so he was straight catheterized overnight with yellow urine return.     PE  General: No distress, No anxiety  VITALS  T(C): 36.6 (01-24-23 @ 08:53), Max: 37.1 (01-23-23 @ 20:16)  HR: 78 (01-24-23 @ 08:53) (65 - 95)  BP: 153/74 (01-24-23 @ 08:53) (122/69 - 153/74)  RR: 17 (01-24-23 @ 08:53) (17 - 17)  SpO2: 99% (01-24-23 @ 08:53) (99% - 100%)            Skin     : No jaundice  HEENT: Normocephalic, no icterus , EOM full , No epistaxis  Lung    : No resp distress  Abdo:   : Soft, Non tender, No guarding, No distension   Back    : No CVAT b/l  Genitalia Male: No Cooper  Neuro   : A&Ox3    LABS                          11.5   7.38  )-----------( 198      ( 23 Jan 2023 08:24 )             32.6   01-23    136  |  103  |  15  ----------------------------<  124<H>  3.8   |  29  |  0.67    Ca    8.4<L>      23 Jan 2023 08:24

## 2023-01-24 NOTE — DISCHARGE NOTE NURSING/CASE MANAGEMENT/SOCIAL WORK - NSDCPEFALRISK_GEN_ALL_CORE
For information on Fall & Injury Prevention, visit: [email or web address] OR  [email or web address] OR  https://www.cdc.gov/steadi/patient.html

## 2023-01-24 NOTE — PROGRESS NOTE ADULT - ASSESSMENT
66 y/o male with hematuria s/p CBI, hematuria resolved. Pt now with urinary retention requiring straight cath overnight. Reports he is voiding in small amounts- notes yellow urine.  Recommend  - Place indwelling desir if patient is retaining urine and d/c patient with desir to leg bag  - Start Flomax 0.4 mg QHS  - Pt can follow up with Urologists in [place] (Dr. Collazo/Juan Jose/Matthew) or Follow up with Dr. Inge Fitzpatrick (located in [facility] [phone]) for trial of void and further management    Case discussed with Dr. Fitzpatrick

## 2023-01-24 NOTE — PROGRESS NOTE ADULT - SUBJECTIVE AND OBJECTIVE BOX
HOSPITALIST ATTENDING PROGRESS NOTE    Chart and meds reviewed.  Patient seen and examined.    CC: gross hematuria    HPI: 66 yo male with PMH of HTN, PAF on asa presents to the ED with hematuria and syncope. Pt states for the past week he has noticed blood in his urine. He went to urgent care on Monday and was prescribed bactrim which he was taking. He noticed worsening hematuria so he made an appt with his urologist Dr. Sorenson.. As he arrived to the office and was getting out of the car he felt lightheaded and fell to the ground. It was witnessed by his wife Adams. He did have LOC and head trauma to the right side of his head. No seizure like activity. Denies any CP, SOB, palpitations. He states he did pass out one other time in his life many years ago. He follows cardio Dr. Tatum and last saw him in September with a normal echo and stress test as per patient. Pt denies any dysuria or fevers. No abd pain. He did notice some blood clots earlier in his urine.   In the ED patient was started on a CBI. He was admitted for further mx. Tele negative for significant arrhythmia. troponins were negative. Syncope was felt to be related to hypovolemia.   Hyponatremia/glynn improved with holding hctz and administration of ivf.   Hematuria has since cleared. Desir was removed but he failed tov, therefore pt will now be discharged with desir. He has been started on flomax/proscar.      pt seen and examined this am. Felt well. No pain. Ambulating without difficulty .Desir reinserted after failing tov.    All other systems reviewed and found to be negative with the exception of what has been described above.    Vital Signs Last 24 Hrs  T(C): 36.6 (24 Jan 2023 08:53), Max: 37.1 (23 Jan 2023 20:16)  T(F): 97.9 (24 Jan 2023 08:53), Max: 98.7 (23 Jan 2023 20:16)  HR: 78 (24 Jan 2023 08:53) (72 - 95)  BP: 153/74 (24 Jan 2023 08:53) (122/69 - 153/74)  BP(mean): 89 (23 Jan 2023 20:16) (89 - 89)  RR: 17 (24 Jan 2023 08:53) (17 - 17)  SpO2: 99% (24 Jan 2023 08:53) (99% - 100%)    Parameters below as of 24 Jan 2023 08:53  Patient On (Oxygen Delivery Method): room air    PHYSICAL EXAM:    GENERAL: Comfortable, no acute distress   HEAD:  Normocephalic, atraumatic  EYES: EOMI, PERRLA  HEENT: Moist mucous membranes  NECK: Supple, No JVD  NERVOUS SYSTEM:  Alert & Oriented X3, Motor Strength 5/5 B/L upper and lower extremities  CHEST/LUNG: Clear to auscultation bilaterally  HEART: Regular rate and rhythm  ABDOMEN: Soft, Nontender, Nondistended, Bowel sounds present  GENITOURINARY: desir  EXTREMITIES:   No clubbing, cyanosis, or edema  MUSCULOSKELTAL- No muscle tenderness, no joint tenderness  SKIN-no rash    LABS:                        11.5   7.38  )-----------( 198      ( 23 Jan 2023 08:24 )             32.6     01-23    136  |  103  |  15  ----------------------------<  124<H>  3.8   |  29  |  0.67    Ca    8.4<L>      23 Jan 2023 08:24    MEDICATIONS  (STANDING):  finasteride 5 milliGRAM(s) Oral daily  influenza  Vaccine (HIGH DOSE) 0.7 milliLiter(s) IntraMuscular once  metoprolol tartrate 100 milliGRAM(s) Oral two times a day  tamsulosin 0.4 milliGRAM(s) Oral at bedtime  valsartan 320 milliGRAM(s) Oral daily    MEDICATIONS  (PRN):  acetaminophen     Tablet .. 650 milliGRAM(s) Oral every 6 hours PRN Temp greater or equal to 38C (100.4F), Mild Pain (1 - 3)  aluminum hydroxide/magnesium hydroxide/simethicone Suspension 30 milliLiter(s) Oral every 4 hours PRN Dyspepsia  melatonin 3 milliGRAM(s) Oral at bedtime PRN Insomnia  ondansetron Injectable 4 milliGRAM(s) IV Push every 8 hours PRN Nausea and/or Vomiting        Assessment/Plan:  #Gross hematuria/ Mild anemia acute blood loss  Urology following  Hematuria improved.  HH stable  s/p CBI  failed tov  desir reinserted  started on flomax/proscar  will f/u with urology outpt for tov.    #GLYNN pre and postrenal due to urinary retention  Resolved    #Syncope likely hypovolemic  Off telemetry    #Hyponatremia- resolved    #Lactic acidosis- resolved    #HTN-   -resume meds on dc    #Pre-diabetes- outpatient PCP f/u    dispo:  home today.

## 2023-01-24 NOTE — PROGRESS NOTE ADULT - REASON FOR ADMISSION
gross hematuria

## 2023-01-24 NOTE — DISCHARGE NOTE NURSING/CASE MANAGEMENT/SOCIAL WORK - PATIENT PORTAL LINK FT
You can access the FollowMyHealth Patient Portal offered by [facility] by registering at the following website: [email or web address]. By joining [employer]’s FollowMyHealth portal, you will also be able to view your health information using other applications (apps) compatible with our system.

## 2023-01-25 ENCOUNTER — NON-APPOINTMENT (OUTPATIENT)
Age: 68
End: 2023-01-25

## 2023-01-25 ENCOUNTER — APPOINTMENT (OUTPATIENT)
Dept: UROLOGY | Facility: CLINIC | Age: 68
End: 2023-01-25
Payer: COMMERCIAL

## 2023-01-25 VITALS
OXYGEN SATURATION: 99 % | DIASTOLIC BLOOD PRESSURE: 91 MMHG | HEART RATE: 81 BPM | RESPIRATION RATE: 16 BRPM | SYSTOLIC BLOOD PRESSURE: 170 MMHG

## 2023-01-25 DIAGNOSIS — Z82.49 FAMILY HISTORY OF ISCHEMIC HEART DISEASE AND OTHER DISEASES OF THE CIRCULATORY SYSTEM: ICD-10-CM

## 2023-01-25 DIAGNOSIS — Z83.3 FAMILY HISTORY OF DIABETES MELLITUS: ICD-10-CM

## 2023-01-25 DIAGNOSIS — Z86.79 PERSONAL HISTORY OF OTHER DISEASES OF THE CIRCULATORY SYSTEM: ICD-10-CM

## 2023-01-25 DIAGNOSIS — Z78.9 OTHER SPECIFIED HEALTH STATUS: ICD-10-CM

## 2023-01-25 PROBLEM — I48.91 UNSPECIFIED ATRIAL FIBRILLATION: Chronic | Status: ACTIVE | Noted: 2023-01-23

## 2023-01-25 PROBLEM — I10 ESSENTIAL (PRIMARY) HYPERTENSION: Chronic | Status: ACTIVE | Noted: 2023-01-23

## 2023-01-25 PROCEDURE — 51700 IRRIGATION OF BLADDER: CPT

## 2023-01-25 PROCEDURE — 99204 OFFICE O/P NEW MOD 45 MIN: CPT | Mod: 25

## 2023-01-25 PROCEDURE — A4216: CPT | Mod: NC

## 2023-01-25 RX ORDER — METOPROLOL TARTRATE 100 MG/1
100 TABLET, FILM COATED ORAL
Refills: 0 | Status: ACTIVE | COMMUNITY
Start: 2023-01-25

## 2023-01-25 RX ORDER — VALSARTAN 320 MG/1
320 TABLET, COATED ORAL
Refills: 0 | Status: ACTIVE | COMMUNITY
Start: 2023-01-25

## 2023-01-25 NOTE — PHYSICAL EXAM
[General Appearance - Well Developed] : well developed [General Appearance - Well Nourished] : well nourished [Normal Appearance] : normal appearance [Well Groomed] : well groomed [General Appearance - In No Acute Distress] : no acute distress [Edema] : no peripheral edema [] : no respiratory distress [Exaggerated Use Of Accessory Muscles For Inspiration] : no accessory muscle use [Abdomen Soft] : soft [Urethral Meatus] : meatus normal [Penis Abnormality] : normal circumcised penis [Normal Station and Gait] : the gait and station were normal for the patient's age [Skin Color & Pigmentation] : normal skin color and pigmentation [Oriented To Time, Place, And Person] : oriented to person, place, and time [Affect] : the affect was normal [Mood] : the mood was normal [Not Anxious] : not anxious [FreeTextEntry1] : desir with see through hematuria, 1 clot noted in drng bag.

## 2023-01-25 NOTE — ASSESSMENT
[FreeTextEntry1] : Gross hematuria, BPH,urine retention\par \par Desir drng bag noted hanging below right knee, velcro straps loose, no stat lock noted. Pt reports ha had a stat lock on and that it must have come off.\par Drainage bag changed and secured with stat lock.\par I irrigated desir with 100 cc NS, clear medium pink return with no clots\par I informed pt to keep desir positioned at thigh, an extra stat lock and drng bag provided, I demonstrated how to empty abg.\par Will send for CT Urogram, creatinine 0.64 1/24/23\par Continue Finasteride & Tamsulosin\par Notify office if worsening hematuria\par RTO for Cysto in 2 weeks\par \par Records reviewed:\par Renal US 1/20/22: Desir cath in bladder lumen, heterogeneous echotexture inferior to location of desir. May represent a markedly enlarged prostate.\par U/A 1/19 small leuk, large blood, RBC >50. Culture negative\par Creatinine 0.67\par H/H 11.5/32.6

## 2023-01-25 NOTE — HISTORY OF PRESENT ILLNESS
[FreeTextEntry1] : Call received today from Rajni, pt's wife called office pt with hematuria and clots. Pt looking for closer office to be seen at today as he lives in [place].  I offered for pt to be seen in [place] today. PT states urine was clear last night but this morning became bloody with a couple of clots. Pt states never seen by Urology in the past. Prior to desir being placed he reports having frequency, urgency, slow stream, nocturia x 2.\par \par Pt developed hematuria and clots went to Urgent care about a week ago and treated for UTI with Bactrim (culture came back neg as per pt) His hematuria became worse so he made a new patient appointment with Urology Dr. Sorenson.  On 1/16/23 while in the parking lot to see Urology he became dizzy and loss consciousness for abut 30 seconds as per wife. Pt was admitted to [facility], desir was placed with CBI. On 1/23 desir was removed and he was started on Tamsulosin and Finasteride, unable to void during the night and straight cathed for 600 ml, desir replaced 1/24/23.

## 2023-01-28 DIAGNOSIS — R33.9 RETENTION OF URINE, UNSPECIFIED: ICD-10-CM

## 2023-01-28 DIAGNOSIS — I10 ESSENTIAL (PRIMARY) HYPERTENSION: ICD-10-CM

## 2023-01-28 DIAGNOSIS — R31.0 GROSS HEMATURIA: ICD-10-CM

## 2023-01-28 DIAGNOSIS — D62 ACUTE POSTHEMORRHAGIC ANEMIA: ICD-10-CM

## 2023-01-28 DIAGNOSIS — Z79.82 LONG TERM (CURRENT) USE OF ASPIRIN: ICD-10-CM

## 2023-01-28 DIAGNOSIS — E87.20 ACIDOSIS, UNSPECIFIED: ICD-10-CM

## 2023-01-28 DIAGNOSIS — E87.1 HYPO-OSMOLALITY AND HYPONATREMIA: ICD-10-CM

## 2023-01-28 DIAGNOSIS — Z82.3 FAMILY HISTORY OF STROKE: ICD-10-CM

## 2023-01-28 DIAGNOSIS — I48.0 PAROXYSMAL ATRIAL FIBRILLATION: ICD-10-CM

## 2023-01-28 DIAGNOSIS — R73.03 PREDIABETES: ICD-10-CM

## 2023-01-29 ENCOUNTER — EMERGENCY (EMERGENCY)
Facility: HOSPITAL | Age: 68
LOS: 1 days | Discharge: HOME | End: 2023-01-29
Attending: EMERGENCY MEDICINE
Payer: COMMERCIAL

## 2023-01-29 VITALS
TEMPERATURE: 98 F | WEIGHT: 159.84 LBS | OXYGEN SATURATION: 100 % | HEIGHT: 69 IN | DIASTOLIC BLOOD PRESSURE: 89 MMHG | HEART RATE: 74 BPM | RESPIRATION RATE: 18 BRPM | SYSTOLIC BLOOD PRESSURE: 149 MMHG

## 2023-01-29 VITALS
SYSTOLIC BLOOD PRESSURE: 155 MMHG | DIASTOLIC BLOOD PRESSURE: 81 MMHG | HEART RATE: 77 BPM | OXYGEN SATURATION: 98 % | RESPIRATION RATE: 17 BRPM

## 2023-01-29 LAB
ALBUMIN SERPL ELPH-MCNC: 3.7 G/DL — SIGNIFICANT CHANGE UP (ref 3.3–5.2)
ALP SERPL-CCNC: 81 U/L — SIGNIFICANT CHANGE UP (ref 40–120)
ALT FLD-CCNC: 19 U/L — SIGNIFICANT CHANGE UP
ANION GAP SERPL CALC-SCNC: 9 MMOL/L — SIGNIFICANT CHANGE UP (ref 5–17)
APTT BLD: 33.4 SEC — SIGNIFICANT CHANGE UP (ref 27.5–35.5)
AST SERPL-CCNC: 22 U/L — SIGNIFICANT CHANGE UP
BASOPHILS # BLD AUTO: 0.04 K/UL — SIGNIFICANT CHANGE UP (ref 0–0.2)
BASOPHILS NFR BLD AUTO: 0.7 % — SIGNIFICANT CHANGE UP (ref 0–2)
BILIRUB SERPL-MCNC: 0.2 MG/DL — LOW (ref 0.4–2)
BLD GP AB SCN SERPL QL: SIGNIFICANT CHANGE UP
BUN SERPL-MCNC: 12.8 MG/DL — SIGNIFICANT CHANGE UP (ref 8–20)
CALCIUM SERPL-MCNC: 8.8 MG/DL — SIGNIFICANT CHANGE UP (ref 8.4–10.5)
CHLORIDE SERPL-SCNC: 102 MMOL/L — SIGNIFICANT CHANGE UP (ref 96–108)
CO2 SERPL-SCNC: 27 MMOL/L — SIGNIFICANT CHANGE UP (ref 22–29)
CREAT SERPL-MCNC: 0.66 MG/DL — SIGNIFICANT CHANGE UP (ref 0.5–1.3)
EGFR: 103 ML/MIN/1.73M2 — SIGNIFICANT CHANGE UP
EOSINOPHIL # BLD AUTO: 0.11 K/UL — SIGNIFICANT CHANGE UP (ref 0–0.5)
EOSINOPHIL NFR BLD AUTO: 1.8 % — SIGNIFICANT CHANGE UP (ref 0–6)
GLUCOSE SERPL-MCNC: 135 MG/DL — HIGH (ref 70–99)
HCT VFR BLD CALC: 28 % — LOW (ref 39–50)
HCT VFR BLD CALC: 29.3 % — LOW (ref 39–50)
HCT VFR BLD CALC: 30.1 % — LOW (ref 39–50)
HGB BLD-MCNC: 10 G/DL — LOW (ref 13–17)
HGB BLD-MCNC: 10.2 G/DL — LOW (ref 13–17)
HGB BLD-MCNC: 9.7 G/DL — LOW (ref 13–17)
IMM GRANULOCYTES NFR BLD AUTO: 1 % — HIGH (ref 0–0.9)
INR BLD: 1.1 RATIO — SIGNIFICANT CHANGE UP (ref 0.88–1.16)
LYMPHOCYTES # BLD AUTO: 1.08 K/UL — SIGNIFICANT CHANGE UP (ref 1–3.3)
LYMPHOCYTES # BLD AUTO: 17.8 % — SIGNIFICANT CHANGE UP (ref 13–44)
MCHC RBC-ENTMCNC: 30.6 PG — SIGNIFICANT CHANGE UP (ref 27–34)
MCHC RBC-ENTMCNC: 30.8 PG — SIGNIFICANT CHANGE UP (ref 27–34)
MCHC RBC-ENTMCNC: 33.9 GM/DL — SIGNIFICANT CHANGE UP (ref 32–36)
MCHC RBC-ENTMCNC: 34.1 GM/DL — SIGNIFICANT CHANGE UP (ref 32–36)
MCV RBC AUTO: 90.2 FL — SIGNIFICANT CHANGE UP (ref 80–100)
MCV RBC AUTO: 90.4 FL — SIGNIFICANT CHANGE UP (ref 80–100)
MONOCYTES # BLD AUTO: 0.73 K/UL — SIGNIFICANT CHANGE UP (ref 0–0.9)
MONOCYTES NFR BLD AUTO: 12 % — SIGNIFICANT CHANGE UP (ref 2–14)
NEUTROPHILS # BLD AUTO: 4.04 K/UL — SIGNIFICANT CHANGE UP (ref 1.8–7.4)
NEUTROPHILS NFR BLD AUTO: 66.7 % — SIGNIFICANT CHANGE UP (ref 43–77)
PLATELET # BLD AUTO: 330 K/UL — SIGNIFICANT CHANGE UP (ref 150–400)
PLATELET # BLD AUTO: 339 K/UL — SIGNIFICANT CHANGE UP (ref 150–400)
POTASSIUM SERPL-MCNC: 3.8 MMOL/L — SIGNIFICANT CHANGE UP (ref 3.5–5.3)
POTASSIUM SERPL-SCNC: 3.8 MMOL/L — SIGNIFICANT CHANGE UP (ref 3.5–5.3)
PROT SERPL-MCNC: 6.7 G/DL — SIGNIFICANT CHANGE UP (ref 6.6–8.7)
PROTHROM AB SERPL-ACNC: 12.8 SEC — SIGNIFICANT CHANGE UP (ref 10.5–13.4)
RBC # BLD: 3.25 M/UL — LOW (ref 4.2–5.8)
RBC # BLD: 3.33 M/UL — LOW (ref 4.2–5.8)
RBC # FLD: 12.1 % — SIGNIFICANT CHANGE UP (ref 10.3–14.5)
RBC # FLD: 12.3 % — SIGNIFICANT CHANGE UP (ref 10.3–14.5)
SODIUM SERPL-SCNC: 138 MMOL/L — SIGNIFICANT CHANGE UP (ref 135–145)
WBC # BLD: 6.06 K/UL — SIGNIFICANT CHANGE UP (ref 3.8–10.5)
WBC # BLD: 6.56 K/UL — SIGNIFICANT CHANGE UP (ref 3.8–10.5)
WBC # FLD AUTO: 6.06 K/UL — SIGNIFICANT CHANGE UP (ref 3.8–10.5)
WBC # FLD AUTO: 6.56 K/UL — SIGNIFICANT CHANGE UP (ref 3.8–10.5)

## 2023-01-29 PROCEDURE — 51702 INSERT TEMP BLADDER CATH: CPT

## 2023-01-29 PROCEDURE — 85025 COMPLETE CBC W/AUTO DIFF WBC: CPT

## 2023-01-29 PROCEDURE — 86900 BLOOD TYPING SEROLOGIC ABO: CPT

## 2023-01-29 PROCEDURE — 99284 EMERGENCY DEPT VISIT MOD MDM: CPT

## 2023-01-29 PROCEDURE — 36415 COLL VENOUS BLD VENIPUNCTURE: CPT

## 2023-01-29 PROCEDURE — 86901 BLOOD TYPING SEROLOGIC RH(D): CPT

## 2023-01-29 PROCEDURE — 85018 HEMOGLOBIN: CPT

## 2023-01-29 PROCEDURE — 85610 PROTHROMBIN TIME: CPT

## 2023-01-29 PROCEDURE — 85730 THROMBOPLASTIN TIME PARTIAL: CPT

## 2023-01-29 PROCEDURE — 85027 COMPLETE CBC AUTOMATED: CPT

## 2023-01-29 PROCEDURE — 85014 HEMATOCRIT: CPT

## 2023-01-29 PROCEDURE — 80053 COMPREHEN METABOLIC PANEL: CPT

## 2023-01-29 PROCEDURE — 86850 RBC ANTIBODY SCREEN: CPT

## 2023-01-29 NOTE — ED ADULT TRIAGE NOTE - CHIEF COMPLAINT QUOTE
pt came in due to leaking desir catheter placed on Wednesday 1/25. hx of urinary retention and prostate

## 2023-01-29 NOTE — CONSULT NOTE ADULT - NS ATTEND AMEND GEN_ALL_CORE FT
I had spoke to patient twice during the day yesterday but presented to ER with urine and clot around the cath.  Has an appointment to see Dr. Anne for cysto tomorrow.  no flank pain or fevers.     OK to discharge given the good drainage with the new catheter.

## 2023-01-29 NOTE — CHART NOTE - NSCHARTNOTEFT_GEN_A_CORE
Called by ER earlier with jesus blood, agreed with Dr. Hoffman to start CBI, followed up with patient:  Patient seen and examined at bedside.  Awake, alert in no acute distress, desir in place CBI in progress noted urine running clear - slowed CBI rate down - patient denies any urinary symptoms at present.  No supra-pubic discomfort, no desir discomfort- desir draining freely and well.  Plan:  continue present care and management and follow.

## 2023-01-29 NOTE — ED PROVIDER NOTE - PROGRESS NOTE DETAILS
Jeffery: I spoke with urology PA after patient's third hgb which increased from 9.7 to 10.0. Patient has appointment with Dr. Anne tomorrow for cystoscopy.

## 2023-01-29 NOTE — ED ADULT NURSE NOTE - CAS TRG GEN SKIN CONDITION
UROLOGY: Pt is a 65y/Warm

## 2023-01-29 NOTE — ED PROVIDER NOTE - CARE PROVIDER_API CALL
Martin Anne)  Urology  [address], [address]  [place] [address]  Phone: [phone]  Fax: [phone]  Established Patient  Scheduled Appointment: 01/30/2023

## 2023-01-29 NOTE — ED PROVIDER NOTE - OBJECTIVE STATEMENT
Patient is a 66yo M with PMHx of HTN, afib who presents to the ED complaining of leaking around his desir catheter. The leakage only occurs when he is draining the desir bag. The desir has been draining well and he has been emptying the bag every three hours. Patient was recently admitted at [facility] 1/19-24 for hematuria, improved with CBI, but still with some persistent intermittent hematuria at discharge which has continued.  He was seen in the urology office  by Dr. Pillai on Thursday and the catheter was noted to be missing the leg sticker that anchors the desir, so it had been pulling traction downwards until then. Denies pain, distension, fever.

## 2023-01-29 NOTE — ED PROVIDER NOTE - PHYSICAL EXAMINATION
Gen: AAOx3, NAD, well nourished  HEENT: Normocephalic atraumatic. EOMI. No scleral icterus. Moist mucus membranes.  CV: RRR. Audible S1 and S2. No murmurs. 2+ radial and PT pulses   Pulm: Clear to auscultation bilaterally. No wheezes, rales, or rhonchi. No accessory muscle use or respiratory distress.  Abdomen: soft, normoactive BS, non distended, nontender, no rebound, no guarding  Musculoskeletal:  Moving all extremities equally. No gross deformity. No tenderness to palpation.  Skin: No rashes or lesions. Warm.  Neurologic: No focal neurological deficits. CN II-XII grossly intact.  : desir in place, gross hematuria in leg bag, no active leaking noted at this time.   Psych: Appropriate mood and affect. Cooperative.

## 2023-01-29 NOTE — ED PROVIDER NOTE - ATTENDING CONTRIBUTION TO CARE
68 yo M s/p recent admission at [facility] for gross hematuria c/o intermittent leakage around tip of penis when he drains his leg pain.  Pt also c/o intermittent hematuria.  Pt seen by Urology/ Dr. Pillai as outpt and there was no leg anchor present.  Pt denies any assoc fever, chills, abd pain, flank pain or vomiting.  On exam awake and alert, pleasant in NAD, conjunctiva without pallor, mm moist, neck supple, Cor Reg, lungs clear b/l, abd soft, NT,  + desir in place with gross hematuria, Ext FROM, Neuro intact.  Will check labs, consult Urology and re-eval

## 2023-01-29 NOTE — ED PROVIDER NOTE - CLINICAL SUMMARY MEDICAL DECISION MAKING FREE TEXT BOX
Patient is a 68yo M with PMHx of HTN, afib who presents to the ED complaining of leaking around his desir catheter. Urology consulted. Will place a 3 way for CBI given persistent hematuria.

## 2023-01-29 NOTE — ED ADULT NURSE REASSESSMENT NOTE - NS ED NURSE REASSESS COMMENT FT1
Pt d/c iv out , education provided to wife and pt on ua bag use - extra bag given for replacement - pt and wife verbalized understanding - pt to f/u w/ urology yessi valdez
Pt in no acute distress denies any discomfort at this time - CBI in progress flushing well - will continue to monitor elao rn
pt resting comfortably showing no signs of respiratory distress or pain, the pt is calm and cooperative, Cooper catheter placed

## 2023-01-29 NOTE — ED PROVIDER NOTE - PATIENT PORTAL LINK FT
You can access the FollowMyHealth Patient Portal offered by [facility] by registering at the following website: [email or web address]. By joining [employer]’s FollowMyHealth portal, you will also be able to view your health information using other applications (apps) compatible with our system.

## 2023-01-29 NOTE — ED ADULT NURSE NOTE - OBJECTIVE STATEMENT
Assumed care of pt at 0200. Pt A&Ox4 c/o a leaky Cooper, the pt states that he came in due to his Cooper leaking, the pt states that the Cooper was placed on Wednesday 1/25, the pt states that he has prostate history and a history of urinary retention, the pt also complains of hematuria, the pt denies N/V/D/CP/SOB, the pt is resting comfortably showing no signs of respiratory distress or pain, the pt is calm and cooperative

## 2023-01-29 NOTE — CONSULT NOTE ADULT - SUBJECTIVE AND OBJECTIVE BOX
UROLOGY CONSULT: Consult requested for hematuria. Last week patient had an appointment with urologist in [place] for hematuria, and while in the parking lot before his appointment, he passed out. He was admitted to [facility], where he was on CBI for a few days, and also had a syncope work up. He was discharged with a desir bag after a failed TOV. The desir was never placed in a adhesive and was tugging. He was seen in Dr. Pillai's office by Mik for hematuria a few days ago, who placed the desir in the adhesive. The hematuria continued, which prompted him to come to the ED, as directed by Dr. Pillai. In addition to hematuria, he also reports leakage around the desir at the meatus. He denies fever, chills, abdominal pain, n/v, dysuria.     PAST MEDICAL & SURGICAL HISTORY:  HTN (hypertension)  Atrial fibrillation    Home Medications:  acetaminophen 325 mg oral tablet: 2 tab(s) orally every 6 hours, As needed, Temp greater or equal to 38C (100.4F), Mild Pain (1 - 3) (23 Jan 2023 15:07)  metoprolol tartrate 100 mg oral tablet: 1 tab(s) orally 2 times a day (19 Jan 2023 19:56)  valsartan 320 mg oral tablet: 1 tab(s) orally once a day (19 Jan 2023 19:56)    Review of Systems: All negative except where noted in HPI    Vital Signs Last 24 Hrs  T(C): 36.6 (29 Jan 2023 01:31), Max: 36.6 (29 Jan 2023 01:31)  T(F): 97.9 (29 Jan 2023 01:31), Max: 97.9 (29 Jan 2023 01:31)  HR: 74 (29 Jan 2023 01:31) (74 - 74)  BP: 149/89 (29 Jan 2023 01:31) (149/89 - 149/89)  BP(mean): --  RR: 18 (29 Jan 2023 01:31) (18 - 18)  SpO2: 100% (29 Jan 2023 01:31) (100% - 100%)    Parameters below as of 29 Jan 2023 01:31  Patient On (Oxygen Delivery Method): room air    Physical Exam:  General: NAD  HEENT: PERRL, EOMI  Pulm: Respirations non labored, no accessory muscle use  Abdomen: Soft, NT/ND, without rebound or guarding  Neuro: Alert and oriented x3, no focal deficits  : Desir in place connected to leg bag, draining fruit punch colored urine,  no clots or debris noted/     LABS:                    10.2   6.06  )-----------( 339      ( 29 Jan 2023 02:42 )             30.1   01-29  138  |  102  |  12.8  ----------------------------<  135<H>  3.8   |  27.0  |  0.66  Ca    8.8      29 Jan 2023 02:42  TPro  6.7  /  Alb  3.7  /  TBili  0.2<L>  /  DBili  x   /  AST  22  /  ALT  19  /  AlkPhos  81  01-29  PT/INR - ( 29 Jan 2023 02:42 )   PT: 12.8 sec;   INR: 1.10 ratio     PTT - ( 29 Jan 2023 02:42 )  PTT:33.4 sec    A: Patient is a 68 yo M presenting with a few days of hematuria.     Plan:   - Trend h/h   - Will consider CBI     Plan to be discussed with Dr. Pillai  UROLOGY CONSULT: Consult requested for hematuria. Last week patient had an appointment with urologist in [place] for hematuria, and while in the parking lot before his appointment, he passed out. He was admitted to [facility], where he was on CBI for a few days, and also had a syncope work up. He was discharged with a desir bag after a failed TOV. The desir was never placed in a adhesive and was tugging. He was seen in Dr. Pillai's office by Mik for hematuria a few days ago, who placed the desir in the adhesive. The hematuria continued, which prompted him to come to the ED, as directed by Dr. Pillai. In addition to hematuria, he also reports leakage around the desir at the meatus. He denies fever, chills, abdominal pain, n/v, dysuria.     PAST MEDICAL & SURGICAL HISTORY:  HTN (hypertension)  Atrial fibrillation    Home Medications:  acetaminophen 325 mg oral tablet: 2 tab(s) orally every 6 hours, As needed, Temp greater or equal to 38C (100.4F), Mild Pain (1 - 3) (23 Jan 2023 15:07)  metoprolol tartrate 100 mg oral tablet: 1 tab(s) orally 2 times a day (19 Jan 2023 19:56)  valsartan 320 mg oral tablet: 1 tab(s) orally once a day (19 Jan 2023 19:56)    Review of Systems: All negative except where noted in HPI    Vital Signs Last 24 Hrs  T(C): 36.6 (29 Jan 2023 01:31), Max: 36.6 (29 Jan 2023 01:31)  T(F): 97.9 (29 Jan 2023 01:31), Max: 97.9 (29 Jan 2023 01:31)  HR: 74 (29 Jan 2023 01:31) (74 - 74)  BP: 149/89 (29 Jan 2023 01:31) (149/89 - 149/89)  BP(mean): --  RR: 18 (29 Jan 2023 01:31) (18 - 18)  SpO2: 100% (29 Jan 2023 01:31) (100% - 100%)    Parameters below as of 29 Jan 2023 01:31  Patient On (Oxygen Delivery Method): room air    Physical Exam:  General: NAD  HEENT: PERRL, EOMI  Pulm: Respirations non labored, no accessory muscle use  Abdomen: Soft, NT/ND, without rebound or guarding  Neuro: Alert and oriented x3, no focal deficits  : Desir in place connected to leg bag, draining fruit punch colored urine,  no clots or debris noted/     LABS:                    10.2   6.06  )-----------( 339      ( 29 Jan 2023 02:42 )             30.1   01-29  138  |  102  |  12.8  ----------------------------<  135<H>  3.8   |  27.0  |  0.66  Ca    8.8      29 Jan 2023 02:42  TPro  6.7  /  Alb  3.7  /  TBili  0.2<L>  /  DBili  x   /  AST  22  /  ALT  19  /  AlkPhos  81  01-29  PT/INR - ( 29 Jan 2023 02:42 )   PT: 12.8 sec;   INR: 1.10 ratio     PTT - ( 29 Jan 2023 02:42 )  PTT:33.4 sec    A/P: Patient is a 66 yo M presenting with a few days of hematuria. His desir was replaced with a 3way by the ED. His desir was irrigated, no retrieval of clots or debris, urine was lighter in color after irrigation. Will monitor. If continues to clear, does not need CBI.   Trend h/h.     Plan to be discussed with Dr. Pillai  UROLOGY CONSULT: Consult requested for hematuria. Last week patient had an appointment with urologist in [place] for hematuria, and while in the parking lot before his appointment, he passed out. He was admitted to [facility], where he was on CBI for a few days, and also had a syncope work up. He was discharged with a desir bag after a failed TOV. The desir was never placed in a adhesive and was tugging. He was seen in Dr. Pillai's office by Mik for hematuria a few days ago, who placed the desir in the adhesive. The hematuria continued, which prompted him to come to the ED, as directed by Dr. Pillai. In addition to hematuria, he also reports leakage around the desir at the meatus. He denies fever, chills, abdominal pain, n/v, dysuria.     PAST MEDICAL & SURGICAL HISTORY:  HTN (hypertension)  Atrial fibrillation    Home Medications:  acetaminophen 325 mg oral tablet: 2 tab(s) orally every 6 hours, As needed, Temp greater or equal to 38C (100.4F), Mild Pain (1 - 3) (23 Jan 2023 15:07)  metoprolol tartrate 100 mg oral tablet: 1 tab(s) orally 2 times a day (19 Jan 2023 19:56)  valsartan 320 mg oral tablet: 1 tab(s) orally once a day (19 Jan 2023 19:56)    Review of Systems: All negative except where noted in HPI    Vital Signs Last 24 Hrs  T(C): 36.6 (29 Jan 2023 01:31), Max: 36.6 (29 Jan 2023 01:31)  T(F): 97.9 (29 Jan 2023 01:31), Max: 97.9 (29 Jan 2023 01:31)  HR: 74 (29 Jan 2023 01:31) (74 - 74)  BP: 149/89 (29 Jan 2023 01:31) (149/89 - 149/89)  BP(mean): --  RR: 18 (29 Jan 2023 01:31) (18 - 18)  SpO2: 100% (29 Jan 2023 01:31) (100% - 100%)    Parameters below as of 29 Jan 2023 01:31  Patient On (Oxygen Delivery Method): room air    Physical Exam:  General: NAD  HEENT: PERRL, EOMI  Pulm: Respirations non labored, no accessory muscle use  Abdomen: Soft, NT/ND, without rebound or guarding  Neuro: Alert and oriented x3, no focal deficits  : Desir in place connected to leg bag, draining fruit punch colored urine,  no clots or debris noted/     LABS:                    10.2   6.06  )-----------( 339      ( 29 Jan 2023 02:42 )             30.1   01-29  138  |  102  |  12.8  ----------------------------<  135<H>  3.8   |  27.0  |  0.66  Ca    8.8      29 Jan 2023 02:42  TPro  6.7  /  Alb  3.7  /  TBili  0.2<L>  /  DBili  x   /  AST  22  /  ALT  19  /  AlkPhos  81  01-29  PT/INR - ( 29 Jan 2023 02:42 )   PT: 12.8 sec;   INR: 1.10 ratio     PTT - ( 29 Jan 2023 02:42 )  PTT:33.4 sec    A/P: Patient is a 66 yo M presenting with a few days of hematuria. His desir was replaced with a 3way by the ED. His desir was irrigated, no retrieval of clots or debris, urine was lighter in color after irrigation. Will monitor. If continues to clear, does not need CBI. If dark hematuria persists, will start CBI. Trend h/h.     Plan to be discussed with Dr. Pillai

## 2023-01-29 NOTE — ED PROVIDER NOTE - NS ED ROS FT
General: No fever, chills.  EENT: No vision changes, hearing changes, nasal congestion, throat pain, difficulty swallowing.  Respiratory: No SOB, cough, wheezing.  Cardiac: No chest pain, palpitations, lower extremity edema.  GI: No abdominal pain, nausea, vomiting, diarrhea, constipation.  : see hpi  Skin: No rashes.  Neuro: No headache, dizziness, lightheadedness.  MSK: No muscle pain, joint pain, back pain.  Psych: No known mental health issues.  Endocrine: No heat/cold intolerance, no polyuria/polydipsia.  Heme: No easy bruising or bleeding.  Allergic: No pruritis, dermatitis, or environmental allergies.

## 2023-01-29 NOTE — ED ADULT NURSE NOTE - SUICIDE SCREENING QUESTION 3
[facility] Progress note Name: Maru Das MRN: [id number] : 1947 [facility] Date: 2019 IMPRESSION:  
· Multisystem organ failure · Hemorrhagic shock · Acute severe blood loss anemia · KAYLEEN with severe metabolic acidosis · Duodenal ulcer- S/P clipping, IR embolization planned · Acute resp failure with failure to meet MV demands · PHTN- by ECHO PASP 79 EF 50% no AV/MV disease, but RHC data (below) a bit better. Portopulmonary HTN- ETOH abuse and hypoalbuminemic ? Occult cirrhosis · S/p LHC/RCH 7/10: RV 60/4, PA 61/15 mean 29, /7, /48, RA mean 4, PCW mean 7, CO 2.85-- echo reviewed: biatrial dilatation, left to right blowing of interatrial septum. Cath findings show precapillary PH after diuresis, likely mixed picture on presentation (combined pre and post capillary PH) · HTN 
· Right effusion-exudate- ? From [facility] or other DDX is hepatic hydrothorax - cultures and cytology negative · SBO- conservative MGMT  
  
RECOMMENDATIONS:  
· VACV- settings adjusted · Supportive transfusions · Pressors- reviewed and adjusted- D/W RN 
· On antibiotics · Start bicarb infusion · For CRRT 
· Sedation reviewed · Continue colloids · On protonix infusion · Stop heparin · Repeat labs and ABG later today to assess for additional interventions · Critically ill, at high risk for decline and death. '. D/W Teri Vazquez, RT and RN Subjective:  
 Seen and examined earlier today. Transferred to CVICU after rapid response for SOB. Intubated soon after. Found in refractory shock with massive GI bleed secondary to duodenal ulcer. Pressors adjusted at bedside. PH buffered with supplemental bicarb for severe metabolic acidosis. Receiving large amounts of blood products. Fluid resuscitation ongoing.  S/O clipping of visible vessel by GI but at high risk for re-bleed and IR embolization is planned. Worsening renal failure with ATN and recent contrast- CRRT is planned.  He feels well today. No acute complaints  States that his breathing feels much better, wife at bedside tells me that he looks better and much more comfortable than previously. CXR much improved. 7/10 
stable  This patient has been seen and evaluated at the request of Dr. Sherri Allen for PHTN. Patient is a 70 y.o. male h/o ETOH abuse presents with 6 months progressive Lozano and presented with large right effusion- tapped- exudative. Negative micro. Thoracic placed pigtail and now out. ECHO with severe PHTN. Pt alert denies h/o PE, CTD, no h/o lung disease. Past Medical History:  
Diagnosis Date  Arthritic-like pain  Hypertension History reviewed. No pertinent surgical history. Prior to Admission medications Medication Sig Start Date End Date Taking? Authorizing Provider  
therapeutic multivitamin [facility]) tablet Take 1 Tab by mouth daily. Yes Provider, Historical  
ergocalciferol (VITAMIN D2) 50,000 unit capsule Take 50,000 Units by mouth every . Yes Provider, Historical  
aspirin delayed-release 81 mg tablet Take  by mouth daily. Yes Provider, Historical  
 
No Known Allergies Social History Tobacco Use  Smoking status: Former Smoker Start date: 1966 Last attempt to quit: 1984 Years since quittin.6  Smokeless tobacco: Never Used Substance Use Topics  Alcohol use: Yes Alcohol/week: 7.0 oz Types: 14 drink(s) per week Family History Problem Relation Age of Onset  Diabetes Mother  Hypertension Mother  Heart Disease Mother  Asthma Mother Verba Bright Arthritis-osteo Mother  Diabetes Father  Hypertension Father  Asthma Father  Arthritis-osteo Father  Diabetes Sister  Gout Sister  Asthma Brother Current Facility-Administered Medications Medication Dose Route Frequency  PHENYLephrine (ARTURO-SYNEPHRINE) 30 mg in 0.9% sodium chloride 250 mL infusion   mcg/min IntraVENous TITRATE  
 NOREPINephrine (LEVOPHED) 8 mg in 5% dextrose 250mL infusion  2-30 mcg/min IntraVENous TITRATE  rocuronium (ZEMURON) 10 mg/mL injection  propofol (DIPRIVAN) infusion  0-50 mcg/kg/min IntraVENous TITRATE  sodium chloride (NS) flush 5-40 mL  5-40 mL IntraVENous Q8H  
 sodium bicarbonate 8.4 % (1 mEq/mL) injection 150 mEq  150 mEq IntraVENous ONCE  
 sodium bicarbonate (8.4%) 150 mEq in sterile water 1,000 mL infusion   IntraVENous CONTINUOUS  
 albumin human 25% (BUMINATE) solution 12.5 g  12.5 g IntraVENous Q6H  
 bicarbonate dialysis (PRISMASOL) BG K 2/Ca 3.5 5000 ml solution   Extracorporeal Q1H  
 pantoprazole (PROTONIX) 40 mg in 0.9% sodium chloride (MBP/ADV) 50 mL  8 mg/hr IntraVENous CONTINUOUS  
 lidocaine (PF) (XYLOCAINE) 10 mg/mL (1 %) injection 10 mL  10 mL SubCUTAneous ONCE  
 iopamidol (ISOVUE 300) 61 % contrast injection 100 mL  100 mL IntraCATHeter RAD ONCE  
 iopamidol (ISOVUE 300) 61 % contrast injection 100 mL  100 mL IntraCATHeter RAD ONCE  
 vasopressin (VASOSTRICT) 20 Units in 0.9% sodium chloride 100 mL infusion  0-0.04 Units/min IntraVENous TITRATE  lidocaine (PF) (XYLOCAINE) 10 mg/mL (1 %) injection  iopamidol (ISOVUE 300) 61 % contrast injection  [Held by provider] furosemide (LASIX) tablet 40 mg  40 mg Oral DAILY  aspirin delayed-release tablet 81 mg  81 mg Oral DAILY  sodium chloride (NS) flush 5-40 mL  5-40 mL IntraVENous Q8H  
 lactobac ac& pc-s.therm-b.anim (KERLINE Q/RISAQUAD)  1 Cap Oral DAILY  thiamine HCL (B-1) tablet 100 mg  100 mg Oral DAILY  folic acid (FOLVITE) tablet 1 mg  1 mg Oral DAILY  therapeutic multivitamin (THERAGRAN) tablet 1 Tab  1 Tab Oral DAILY  [Held by provider] amLODIPine (NORVASC) tablet 5 mg  5 mg Oral BID  
 
 
 Review of Systems: A comprehensive review of systems was negative except for that written in the HPI. Objective:  
Vital Signs:   
Visit Vitals /52 Comment: art line Pulse (!) 51 Temp 95.2 °F (35.1 °C) Resp 24 Ht 5' 8\" (1.727 m) Wt 63 kg (138 lb 14.2 oz) SpO2 100% BMI 21.12 kg/m² O2 Device: Ventilator O2 Flow Rate (L/min): 2 l/min Temp (24hrs), Av °F (35.6 °C), Min:94.6 °F (34.8 °C), Max:98 °F (36.7 °C) Intake/Output:  
Last shift:       0701 -  1900 In: 620 Out: - Last 3 shifts: No intake/output data recorded. Intake/Output Summary (Last 24 hours) at 2019 1512 Last data filed at 2019 1100 Gross per 24 hour Intake 620 ml Output  Net 620 ml Physical Exam:  
General:  Intubated, appears stated age. Head:  Normocephalic, without obvious abnormality, atraumatic. Eyes:  Conjunctivae/corneas - pale Nose: Nares normal. Septum midline Neck: Supple, symmetrical, trachea midline Lungs:   Clear to auscultation bilaterally. Heart:  Regular rate and rhythm, S1, S2 normal, no murmur, click, rub or gallop. Abdomen:   Distended Extremities: Extremities normal, atraumatic, no cyanosis or edema. Pulses: 2+ and symmetric all extremities. Skin: Skin color, texture, turgor normal. No rashes or lesions Data review:  
 
Recent Results (from the past 24 hour(s)) CBC WITH AUTOMATED DIFF Collection Time: 19  3:32 AM  
Result Value Ref Range WBC 13.6 (H) 4.1 - 11.1 K/uL  
 RBC 2.67 (L) 4.10 - 5.70 M/uL HGB 7.0 (L) 12.1 - 17.0 g/dL HCT 23.0 (L) 36.6 - 50.3 % MCV 86.1 80.0 - 99.0 FL  
 MCH 26.2 26.0 - 34.0 PG  
 MCHC 30.4 30.0 - 36.5 g/dL  
 RDW 14.0 11.5 - 14.5 % PLATELET 755 390 - 698 K/uL MPV 10.6 8.9 - 12.9 FL  
 NRBC 0.1 (H) 0  WBC ABSOLUTE NRBC 0.02 (H) 0.00 - 0.01 K/uL NEUTROPHILS 79 (H) 32 - 75 % LYMPHOCYTES 10 (L) 12 - 49 % MONOCYTES 6 5 - 13 % EOSINOPHILS 3 0 - 7 % BASOPHILS 2 (H) 0 - 1 % IMMATURE GRANULOCYTES 0 %  
 ABS. NEUTROPHILS 10.7 (H) 1.8 - 8.0 K/UL  
 ABS. LYMPHOCYTES 1.4 0.8 - 3.5 K/UL  
 ABS. MONOCYTES 0.8 0.0 - 1.0 K/UL  
 ABS. EOSINOPHILS 0.4 0.0 - 0.4 K/UL  
 ABS. BASOPHILS 0.3 (H) 0.0 - 0.1 K/UL  
 ABS. IMM. GRANS. 0.0 K/UL  
 DF MANUAL PLATELET COMMENTS Large Platelets RBC COMMENTS ANISOCYTOSIS 1+ 
    
 RBC COMMENTS AURELIA CELLS 
PRESENT 
    
 RBC COMMENTS OVALOCYTES PRESENT 
    
 RBC COMMENTS POLYCHROMASIA 1+ MAGNESIUM Collection Time: 07/13/19  3:32 AM  
Result Value Ref Range Magnesium 2.6 (H) 1.6 - 2.4 mg/dL PHOSPHORUS Collection Time: 07/13/19  3:32 AM  
Result Value Ref Range Phosphorus 5.1 (H) 2.6 - 4.7 MG/DL  
METABOLIC PANEL, COMPREHENSIVE Collection Time: 07/13/19  3:32 AM  
Result Value Ref Range Sodium 143 136 - 145 mmol/L Potassium 4.8 3.5 - 5.1 mmol/L Chloride 116 (H) 97 - 108 mmol/L  
 CO2 17 (L) 21 - 32 mmol/L Anion gap 10 5 - 15 mmol/L Glucose 126 (H) 65 - 100 mg/dL  (H) 6 - 20 MG/DL Creatinine 5.05 (H) 0.70 - 1.30 MG/DL  
 BUN/Creatinine ratio 21 (H) 12 - 20 GFR est AA 14 (L) >60 ml/min/1.73m2 GFR est non-AA 11 (L) >60 ml/min/1.73m2 Calcium 9.9 8.5 - 10.1 MG/DL Bilirubin, total 0.2 0.2 - 1.0 MG/DL  
 ALT (SGPT) 45 12 - 78 U/L  
 AST (SGOT) 30 15 - 37 U/L Alk. phosphatase 104 45 - 117 U/L Protein, total 6.0 (L) 6.4 - 8.2 g/dL Albumin 2.6 (L) 3.5 - 5.0 g/dL Globulin 3.4 2.0 - 4.0 g/dL A-G Ratio 0.8 (L) 1.1 - 2.2 OCCULT BLOOD, STOOL Collection Time: 07/13/19  7:59 AM  
Result Value Ref Range Occult blood, stool POSITIVE (A) NEG    
EKG, 12 LEAD, INITIAL Collection Time: 07/13/19  8:08 AM  
Result Value Ref Range Ventricular Rate 68 BPM  
 Atrial Rate 72 BPM  
 QRS Duration 82 ms Q-T Interval 386 ms QTC Calculation (Bezet) 410 ms Calculated R Axis 5 degrees Calculated T Axis 69 degrees Diagnosis Undetermined rhythm RSR' or QR pattern in V1 suggests right ventricular conduction delay When compared with ECG of 11-JUL-2019 20:44, 
Current undetermined rhythm precludes rhythm comparison, needs review RSR' pattern in V1 is now present Borderline criteria for Lateral infarct are no longer present Criteria for Inferior infarct are no longer present TYPE + CROSSMATCH Collection Time: 07/13/19  8:09 AM  
Result Value Ref Range Crossmatch Expiration 07/16/2019 ABO/Rh(D) B POSITIVE Antibody screen NEG Unit number [id number] Blood component type RC LR Unit division 00 Status of unit ISSUED Crossmatch result Compatible Unit number [id number] Blood component type RC LR Unit division 00 Status of unit ISSUED Crossmatch result Compatible Unit number [id number] Blood component type RC LR Unit division 00 Status of unit ISSUED Crossmatch result Compatible Unit number [id number] Blood component type RC LR Unit division 00 Status of unit ISSUED Crossmatch result Compatible Unit number [id number] Blood component type RC LR Unit division 00 Status of unit ALLOCATED Crossmatch result Compatible Unit number [id number] Blood component type RC LR Unit division 00 Status of unit ALLOCATED Crossmatch result Compatible METABOLIC PANEL, BASIC Collection Time: 07/13/19  8:45 AM  
Result Value Ref Range Sodium 143 136 - 145 mmol/L Potassium 5.0 3.5 - 5.1 mmol/L Chloride 119 (H) 97 - 108 mmol/L  
 CO2 11 (LL) 21 - 32 mmol/L Anion gap 13 5 - 15 mmol/L Glucose 179 (H) 65 - 100 mg/dL  (H) 6 - 20 MG/DL Creatinine 5.32 (H) 0.70 - 1.30 MG/DL  
 BUN/Creatinine ratio 20 12 - 20 GFR est AA 13 (L) >60 ml/min/1.73m2 GFR est non-AA 11 (L) >60 ml/min/1.73m2  Calcium 8.8 8.5 - 10.1 MG/DL  
CBC WITH AUTOMATED DIFF  
 Collection Time: 07/13/19  8:45 AM  
Result Value Ref Range WBC 14.5 (H) 4.1 - 11.1 K/uL  
 RBC 2.15 (L) 4.10 - 5.70 M/uL HGB 5.6 (LL) 12.1 - 17.0 g/dL HCT 19.0 (L) 36.6 - 50.3 % MCV 88.4 80.0 - 99.0 FL  
 MCH 26.0 26.0 - 34.0 PG  
 MCHC 29.5 (L) 30.0 - 36.5 g/dL  
 RDW 13.8 11.5 - 14.5 % PLATELET 585 314 - 006 K/uL MPV 10.6 8.9 - 12.9 FL  
 NRBC 0.1 (H) 0  WBC ABSOLUTE NRBC 0.02 (H) 0.00 - 0.01 K/uL NEUTROPHILS 65 32 - 75 % LYMPHOCYTES 17 12 - 49 % MONOCYTES 12 5 - 13 % EOSINOPHILS 3 0 - 7 % BASOPHILS 2 (H) 0 - 1 % METAMYELOCYTES 1 (H) 0 % IMMATURE GRANULOCYTES 0 %  
 ABS. NEUTROPHILS 9.4 (H) 1.8 - 8.0 K/UL  
 ABS. LYMPHOCYTES 2.5 0.8 - 3.5 K/UL  
 ABS. MONOCYTES 1.7 (H) 0.0 - 1.0 K/UL  
 ABS. EOSINOPHILS 0.4 0.0 - 0.4 K/UL  
 ABS. BASOPHILS 0.3 (H) 0.0 - 0.1 K/UL  
 ABS. IMM. GRANS. 0.0 K/UL  
 DF MANUAL    
 RBC COMMENTS AURELIA CELLS 2+ 
    
 RBC COMMENTS OVALOCYTES 1+ TROPONIN I Collection Time: 07/13/19  8:45 AM  
Result Value Ref Range Troponin-I, Qt. 0.10 (H) <0.05 ng/mL EKG, 12 LEAD, INITIAL Collection Time: 07/13/19  8:46 AM  
Result Value Ref Range Ventricular Rate 66 BPM  
 Atrial Rate 208 BPM  
 QRS Duration 84 ms Q-T Interval 406 ms QTC Calculation (Bezet) 425 ms Calculated R Axis 26 degrees Calculated T Axis 68 degrees Diagnosis Atrial fibrillation Nonspecific ST abnormality , probably digitalis effect Abnormal ECG When compared with ECG of 11-JUL-2019 20:44, Atrial fibrillation has replaced Junctional rhythm Borderline criteria for Lateral infarct are no longer present Nonspecific T wave abnormality no longer evident in Anterolateral leads POC G3 - PUL Collection Time: 07/13/19  8:47 AM  
Result Value Ref Range pH (POC) 7.205 (LL) 7.35 - 7.45    
 pCO2 (POC) 30.8 (L) 35.0 - 45.0 MMHG  
 pO2 (POC) 235 (H) 80 - 100 MMHG  
 HCO3 (POC) 12.2 (L) 22 - 26 MMOL/L  
 sO2 (POC) 100 (H) 92 - 97 % Base deficit (POC) 16 mmol/L Site RIGHT RADIAL Device: NASAL CANNULA Flow rate (POC) 4.5 L/M Allens test (POC) YES Specimen type (POC) ARTERIAL Total resp. rate 16 LACTIC ACID Collection Time: 07/13/19 10:49 AM  
Result Value Ref Range Lactic acid 9.9 (HH) 0.4 - 2.0 MMOL/L  
POC G3 - PUL Collection Time: 07/13/19 11:10 AM  
Result Value Ref Range FIO2 (POC) 100 % pH (POC) 7.123 (LL) 7.35 - 7.45    
 pCO2 (POC) 30.2 (L) 35.0 - 45.0 MMHG  
 pO2 (POC) 474 (H) 80 - 100 MMHG  
 HCO3 (POC) 9.9 (L) 22 - 26 MMOL/L  
 sO2 (POC) 100 (H) 92 - 97 % Base deficit (POC) 19 mmol/L Site LEFT BRACHIAL Device: VENT Mode ASSIST CONTROL Tidal volume 450 ml Set Rate 16 bpm  
 PEEP/CPAP (POC) 8 cmH2O Allens test (POC) NO Specimen type (POC) ARTERIAL Total resp. rate 23 TROPONIN I Collection Time: 07/13/19  1:55 PM  
Result Value Ref Range Troponin-I, Qt. 0.25 (H) <0.05 ng/mL CBC WITH AUTOMATED DIFF Collection Time: 07/13/19  1:55 PM  
Result Value Ref Range WBC 29.1 (H) 4.1 - 11.1 K/uL  
 RBC 3.33 (L) 4.10 - 5.70 M/uL HGB 9.2 (L) 12.1 - 17.0 g/dL HCT 28.6 (L) 36.6 - 50.3 % MCV 85.9 80.0 - 99.0 FL  
 MCH 27.6 26.0 - 34.0 PG  
 MCHC 32.2 30.0 - 36.5 g/dL  
 RDW 16.6 (H) 11.5 - 14.5 % PLATELET 035 612 - 333 K/uL MPV 10.7 8.9 - 12.9 FL  
 NRBC 0.2 (H) 0  WBC ABSOLUTE NRBC 0.05 (H) 0.00 - 0.01 K/uL NEUTROPHILS 74 32 - 75 % BAND NEUTROPHILS 3 0 - 6 % LYMPHOCYTES 7 (L) 12 - 49 % MONOCYTES 12 5 - 13 % EOSINOPHILS 0 0 - 7 % BASOPHILS 0 0 - 1 % METAMYELOCYTES 3 (H) 0 % MYELOCYTES 1 (H) 0 % IMMATURE GRANULOCYTES 0 %  
 ABS. NEUTROPHILS 22.4 (H) 1.8 - 8.0 K/UL  
 ABS. LYMPHOCYTES 2.0 0.8 - 3.5 K/UL  
 ABS. MONOCYTES 3.5 (H) 0.0 - 1.0 K/UL  
 ABS. EOSINOPHILS 0.0 0.0 - 0.4 K/UL  
 ABS. BASOPHILS 0.0 0.0 - 0.1 K/UL  
 ABS. IMM.  GRANS. 0.0 K/UL  
 DF MANUAL    
 RBC COMMENTS ANISOCYTOSIS 
1+ 
    
 RBC COMMENTS AURELIA CELLS 3+ METABOLIC PANEL, BASIC Collection Time: 07/13/19  1:55 PM  
Result Value Ref Range Sodium 148 (H) 136 - 145 mmol/L Potassium 5.0 3.5 - 5.1 mmol/L Chloride 116 (H) 97 - 108 mmol/L  
 CO2 15 (LL) 21 - 32 mmol/L Anion gap 17 (H) 5 - 15 mmol/L Glucose 185 (H) 65 - 100 mg/dL  (H) 6 - 20 MG/DL Creatinine 5.54 (H) 0.70 - 1.30 MG/DL  
 BUN/Creatinine ratio 19 12 - 20 GFR est AA 12 (L) >60 ml/min/1.73m2 GFR est non-AA 10 (L) >60 ml/min/1.73m2 Calcium 8.0 (L) 8.5 - 10.1 MG/DL MAGNESIUM Collection Time: 07/13/19  1:55 PM  
Result Value Ref Range Magnesium 2.4 1.6 - 2.4 mg/dL PHOSPHORUS Collection Time: 07/13/19  1:55 PM  
Result Value Ref Range Phosphorus 7.7 (H) 2.6 - 4.7 MG/DL  
POC G3 - PUL Collection Time: 07/13/19  1:59 PM  
Result Value Ref Range FIO2 (POC) 50 % pH (POC) 7.273 (L) 7.35 - 7.45    
 pCO2 (POC) 32.9 (L) 35.0 - 45.0 MMHG  
 pO2 (POC) 268 (H) 80 - 100 MMHG  
 HCO3 (POC) 15.2 (L) 22 - 26 MMOL/L  
 sO2 (POC) 100 (H) 92 - 97 % Base deficit (POC) 12 mmol/L Site DRAWN FROM ARTERIAL LINE Device: VENT Mode ASSIST CONTROL Tidal volume 450 ml Set Rate 14 bpm  
 PEEP/CPAP (POC) 5 cmH2O Allens test (POC) NO Specimen type (POC) ARTERIAL Total resp. rate 23 Imaging: 
I have personally reviewed the patients radiographs and have reviewed the reports: 
7/5 CT chest moderate to large layering right effusion and RLL ATX LLL ASD no ILD changes no masses- main PA large Mala Gaona MD

## 2023-01-30 ENCOUNTER — APPOINTMENT (OUTPATIENT)
Dept: UROLOGY | Facility: CLINIC | Age: 68
End: 2023-01-30
Payer: COMMERCIAL

## 2023-01-30 VITALS — DIASTOLIC BLOOD PRESSURE: 82 MMHG | HEART RATE: 75 BPM | SYSTOLIC BLOOD PRESSURE: 138 MMHG

## 2023-01-30 PROCEDURE — 52000 CYSTOURETHROSCOPY: CPT

## 2023-01-30 RX ORDER — GENTAMICIN SULFATE 40 MG/ML
40 INJECTION, SOLUTION INTRAMUSCULAR; INTRAVENOUS
Qty: 1 | Refills: 0 | Status: COMPLETED | OUTPATIENT
Start: 2023-01-30

## 2023-01-30 RX ADMIN — GENTAMICIN SULFATE 0 MG/ML: 40 INJECTION, SOLUTION INTRAMUSCULAR; INTRAVENOUS at 00:00

## 2023-01-31 ENCOUNTER — APPOINTMENT (OUTPATIENT)
Dept: UROLOGY | Facility: CLINIC | Age: 68
End: 2023-01-31
Payer: COMMERCIAL

## 2023-01-31 PROCEDURE — A4216: CPT | Mod: NC

## 2023-01-31 PROCEDURE — 51700 IRRIGATION OF BLADDER: CPT

## 2023-01-31 RX ORDER — GENTAMICIN SULFATE 40 MG/ML
40 INJECTION, SOLUTION INTRAMUSCULAR; INTRAVENOUS
Qty: 2 | Refills: 0 | Status: ACTIVE | COMMUNITY
Start: 2023-01-30

## 2023-02-01 ENCOUNTER — APPOINTMENT (OUTPATIENT)
Dept: CT IMAGING | Facility: CLINIC | Age: 68
End: 2023-02-01
Payer: COMMERCIAL

## 2023-02-01 ENCOUNTER — OUTPATIENT (OUTPATIENT)
Dept: OUTPATIENT SERVICES | Facility: HOSPITAL | Age: 68
LOS: 1 days | End: 2023-02-01
Payer: COMMERCIAL

## 2023-02-01 DIAGNOSIS — Z00.8 ENCOUNTER FOR OTHER GENERAL EXAMINATION: ICD-10-CM

## 2023-02-01 PROCEDURE — 74178 CT ABD&PLV WO CNTR FLWD CNTR: CPT | Mod: 26

## 2023-02-01 PROCEDURE — 74178 CT ABD&PLV WO CNTR FLWD CNTR: CPT

## 2023-02-06 ENCOUNTER — APPOINTMENT (OUTPATIENT)
Dept: UROLOGY | Facility: CLINIC | Age: 68
End: 2023-02-06

## 2023-02-09 ENCOUNTER — APPOINTMENT (OUTPATIENT)
Dept: UROLOGY | Facility: CLINIC | Age: 68
End: 2023-02-09

## 2023-02-10 ENCOUNTER — APPOINTMENT (OUTPATIENT)
Dept: UROLOGY | Facility: CLINIC | Age: 68
End: 2023-02-10

## 2023-02-13 ENCOUNTER — APPOINTMENT (OUTPATIENT)
Dept: UROLOGY | Facility: CLINIC | Age: 68
End: 2023-02-13
Payer: COMMERCIAL

## 2023-02-13 VITALS — DIASTOLIC BLOOD PRESSURE: 95 MMHG | HEART RATE: 79 BPM | SYSTOLIC BLOOD PRESSURE: 167 MMHG

## 2023-02-13 PROCEDURE — 99214 OFFICE O/P EST MOD 30 MIN: CPT | Mod: 25

## 2023-02-13 PROCEDURE — 52000 CYSTOURETHROSCOPY: CPT

## 2023-02-13 RX ORDER — GENTAMICIN SULFATE 40 MG/ML
40 INJECTION, SOLUTION INTRAMUSCULAR; INTRAVENOUS
Qty: 2 | Refills: 0 | Status: ACTIVE | COMMUNITY
Start: 2023-02-13

## 2023-02-13 RX ORDER — GENTAMICIN SULFATE 40 MG/ML
40 INJECTION, SOLUTION INTRAMUSCULAR; INTRAVENOUS
Qty: 1 | Refills: 0 | Status: COMPLETED | OUTPATIENT
Start: 2023-02-13

## 2023-02-13 RX ADMIN — GENTAMICIN SULFATE 0 MG/ML: 40 INJECTION, SOLUTION INTRAMUSCULAR; INTRAVENOUS at 00:00

## 2023-02-13 NOTE — HISTORY OF PRESENT ILLNESS
[FreeTextEntry1] : \par see previous note\par here for cystoscopy: very large prostate, no bladder tumor, could not see UO's\par new desir inserted, urine clear\par currently on tamsulosin and finasteride\par CTU: 290gr prostate\par \par had a long discussion with patient and wife\par discussed options of chronic desir or prostate surgery\par offered an option for a TOV, knowing there is a high risk he will fail\par had a long discussion regarding prostate surgery, and due to the size of the prostate offered either LEP or robotic simple prostatectomy.\par \par discussed LEP\par Discussed Laser enucleation of prostate with morcellation (HOLEP/THuLEP). Procedure, in hospital stay and recovery explained.\par Indications, options including chronic desir catheter, suprapubic catheter, intermittent self-catheterization, transurethral resection of prostate, transurethral vaporization of prostate with laser or electrocautery, open or laparoscopic enucleation of the prostate, all discussed.\par \par Potential complications of transurethral holmium or thulium laser enucleation of prostate with morcellation discussed. This included risk of infection, bleeding, transfusion, conversion to open enucleation, need for staged procedure, adjacent organ injury, bladder injury, clot retention of urine requiring return to the operating room for cystoscopy clot evacuation, prolonged duration of desir catheterization, urethral stricture, transient or permanent urinary incontinence,  retrograde ejaculation as a permanent and irreversible complication, redo or staged  surgery due to equipment malfunction, anesthetic complications, and cardiac complications all discussed.\par \par plan:\par - needs PSA ( there is no documentation of PSA)\par - next visit in 1 week to review and decide on surgery or further work up

## 2023-02-13 NOTE — ASSESSMENT
[FreeTextEntry1] : \par plan:\par - needs PSA ( there is no documentation of PSA)\par - next visit in 1 week to review and decide on surgery or further work up

## 2023-02-14 ENCOUNTER — NON-APPOINTMENT (OUTPATIENT)
Age: 68
End: 2023-02-14

## 2023-02-15 ENCOUNTER — APPOINTMENT (OUTPATIENT)
Dept: UROLOGY | Facility: CLINIC | Age: 68
End: 2023-02-15

## 2023-02-16 RX ORDER — TAMSULOSIN HYDROCHLORIDE 0.4 MG/1
0.4 CAPSULE ORAL
Qty: 90 | Refills: 3 | Status: ACTIVE | COMMUNITY
Start: 2023-01-25 | End: 1900-01-01

## 2023-02-16 RX ORDER — FINASTERIDE 5 MG/1
5 TABLET, FILM COATED ORAL DAILY
Qty: 90 | Refills: 2 | Status: ACTIVE | COMMUNITY
Start: 2023-01-25 | End: 1900-01-01

## 2023-02-28 ENCOUNTER — EMERGENCY (EMERGENCY)
Facility: HOSPITAL | Age: 68
LOS: 1 days | Discharge: HOME | End: 2023-02-28
Attending: EMERGENCY MEDICINE
Payer: COMMERCIAL

## 2023-02-28 VITALS
OXYGEN SATURATION: 99 % | SYSTOLIC BLOOD PRESSURE: 150 MMHG | DIASTOLIC BLOOD PRESSURE: 92 MMHG | RESPIRATION RATE: 18 BRPM | HEART RATE: 71 BPM | TEMPERATURE: 98 F

## 2023-02-28 VITALS
SYSTOLIC BLOOD PRESSURE: 155 MMHG | RESPIRATION RATE: 18 BRPM | HEART RATE: 83 BPM | TEMPERATURE: 98 F | DIASTOLIC BLOOD PRESSURE: 105 MMHG | WEIGHT: 160.06 LBS | OXYGEN SATURATION: 98 % | HEIGHT: 69 IN

## 2023-02-28 LAB
ALBUMIN SERPL ELPH-MCNC: 3.7 G/DL — SIGNIFICANT CHANGE UP (ref 3.3–5.2)
ALP SERPL-CCNC: 70 U/L — SIGNIFICANT CHANGE UP (ref 40–120)
ALT FLD-CCNC: 26 U/L — SIGNIFICANT CHANGE UP
ANION GAP SERPL CALC-SCNC: 11 MMOL/L — SIGNIFICANT CHANGE UP (ref 5–17)
APPEARANCE UR: SIGNIFICANT CHANGE UP
AST SERPL-CCNC: 23 U/L — SIGNIFICANT CHANGE UP
BASOPHILS # BLD AUTO: 0.04 K/UL — SIGNIFICANT CHANGE UP (ref 0–0.2)
BASOPHILS NFR BLD AUTO: 0.4 % — SIGNIFICANT CHANGE UP (ref 0–2)
BILIRUB SERPL-MCNC: 0.3 MG/DL — LOW (ref 0.4–2)
BILIRUB UR-MCNC: NEGATIVE — SIGNIFICANT CHANGE UP
BLD GP AB SCN SERPL QL: SIGNIFICANT CHANGE UP
BUN SERPL-MCNC: 16.2 MG/DL — SIGNIFICANT CHANGE UP (ref 8–20)
CALCIUM SERPL-MCNC: 8.8 MG/DL — SIGNIFICANT CHANGE UP (ref 8.4–10.5)
CHLORIDE SERPL-SCNC: 105 MMOL/L — SIGNIFICANT CHANGE UP (ref 96–108)
CO2 SERPL-SCNC: 24 MMOL/L — SIGNIFICANT CHANGE UP (ref 22–29)
COLOR SPEC: ABNORMAL
CREAT SERPL-MCNC: 0.6 MG/DL — SIGNIFICANT CHANGE UP (ref 0.5–1.3)
DIFF PNL FLD: ABNORMAL
EGFR: 106 ML/MIN/1.73M2 — SIGNIFICANT CHANGE UP
EOSINOPHIL # BLD AUTO: 0.08 K/UL — SIGNIFICANT CHANGE UP (ref 0–0.5)
EOSINOPHIL NFR BLD AUTO: 0.8 % — SIGNIFICANT CHANGE UP (ref 0–6)
GLUCOSE SERPL-MCNC: 109 MG/DL — HIGH (ref 70–99)
GLUCOSE UR QL: NEGATIVE — SIGNIFICANT CHANGE UP
HCT VFR BLD CALC: 37.4 % — LOW (ref 39–50)
HGB BLD-MCNC: 12.3 G/DL — LOW (ref 13–17)
IMM GRANULOCYTES NFR BLD AUTO: 0.3 % — SIGNIFICANT CHANGE UP (ref 0–0.9)
KETONES UR-MCNC: NEGATIVE — SIGNIFICANT CHANGE UP
LEUKOCYTE ESTERASE UR-ACNC: ABNORMAL
LYMPHOCYTES # BLD AUTO: 1.34 K/UL — SIGNIFICANT CHANGE UP (ref 1–3.3)
LYMPHOCYTES # BLD AUTO: 13.9 % — SIGNIFICANT CHANGE UP (ref 13–44)
MCHC RBC-ENTMCNC: 30.2 PG — SIGNIFICANT CHANGE UP (ref 27–34)
MCHC RBC-ENTMCNC: 32.9 GM/DL — SIGNIFICANT CHANGE UP (ref 32–36)
MCV RBC AUTO: 91.9 FL — SIGNIFICANT CHANGE UP (ref 80–100)
MONOCYTES # BLD AUTO: 0.6 K/UL — SIGNIFICANT CHANGE UP (ref 0–0.9)
MONOCYTES NFR BLD AUTO: 6.2 % — SIGNIFICANT CHANGE UP (ref 2–14)
NEUTROPHILS # BLD AUTO: 7.54 K/UL — HIGH (ref 1.8–7.4)
NEUTROPHILS NFR BLD AUTO: 78.4 % — HIGH (ref 43–77)
NITRITE UR-MCNC: POSITIVE
PH UR: 7 — SIGNIFICANT CHANGE UP (ref 5–8)
PLATELET # BLD AUTO: 284 K/UL — SIGNIFICANT CHANGE UP (ref 150–400)
POTASSIUM SERPL-MCNC: 3.8 MMOL/L — SIGNIFICANT CHANGE UP (ref 3.5–5.3)
POTASSIUM SERPL-SCNC: 3.8 MMOL/L — SIGNIFICANT CHANGE UP (ref 3.5–5.3)
PROT SERPL-MCNC: 6.4 G/DL — LOW (ref 6.6–8.7)
PROT UR-MCNC: 100
RBC # BLD: 4.07 M/UL — LOW (ref 4.2–5.8)
RBC # FLD: 12.7 % — SIGNIFICANT CHANGE UP (ref 10.3–14.5)
SODIUM SERPL-SCNC: 139 MMOL/L — SIGNIFICANT CHANGE UP (ref 135–145)
SP GR SPEC: 1.01 — SIGNIFICANT CHANGE UP (ref 1.01–1.02)
UROBILINOGEN FLD QL: NEGATIVE — SIGNIFICANT CHANGE UP
WBC # BLD: 9.63 K/UL — SIGNIFICANT CHANGE UP (ref 3.8–10.5)
WBC # FLD AUTO: 9.63 K/UL — SIGNIFICANT CHANGE UP (ref 3.8–10.5)

## 2023-02-28 PROCEDURE — 99285 EMERGENCY DEPT VISIT HI MDM: CPT

## 2023-02-28 PROCEDURE — 51702 INSERT TEMP BLADDER CATH: CPT

## 2023-02-28 RX ORDER — SODIUM CHLORIDE 9 MG/ML
2000 INJECTION INTRAMUSCULAR; INTRAVENOUS; SUBCUTANEOUS ONCE
Refills: 0 | Status: COMPLETED | OUTPATIENT
Start: 2023-02-28 | End: 2023-02-28

## 2023-02-28 RX ADMIN — SODIUM CHLORIDE 2000 MILLILITER(S): 9 INJECTION INTRAMUSCULAR; INTRAVENOUS; SUBCUTANEOUS at 23:40

## 2023-02-28 NOTE — ED PROVIDER NOTE - OBJECTIVE STATEMENT
The patient is a 67 year old male presents with desir catheter in place now with jesus hematuria  The patient seen our Urologist Dr Anne and placed a desir last one 2 weeks ago for BPH and supposed to have surgery next month

## 2023-02-28 NOTE — ED PROVIDER NOTE - TEMPLATE
CC:  Follow up left knee pain, new complaint of right knee pain    Mr. Loaiza comes in today for evaluation of both knees.  I have previously seen him for the left.  He is now also having right knee pain.  Symptoms are nearly identical on both sides.  He reports mostly medial and anterior pain.  The pain is moderate and aching.  Denies any mechanical symptoms or instability.    Both knees are examined.  Skin is benign.  Mild to moderate medial joint line tenderness bilaterally.  He has near full extension bilaterally, within a few degrees.  Flexion is to about 110 degrees bilaterally.  He does have posterior discomfort with hyperflexion on both sides.  Medial and lateral Roscoe's test are mildly uncomfortable bilaterally but not positive.    AP, lateral and merchant views of bilateral knees are ordered and reviewed to evaluate his complaint.  These are compared to previous x-rays.  He has medial compartment joint space narrowing and subchondral sclerosis along with patellofemoral compartment osteophyte formation and subchondral sclerosis.  Overall, he appears to have moderate bilateral osteoarthritis.    Assessment: 1.  Follow-up left knee osteoarthritis 2.  New complaint of right knee osteoarthritis    Plan: We discussed his options.  He elected for injections in both knees.  The risk, benefits and alternatives were discussed, including his elevated risk due to anticoagulation.  He acknowledged understanding of the information and consented.  The injections were performed as described below.  He will follow-up as needed.    Large Joint Arthrocentesis: R knee  Date/Time: 1/10/2022 2:40 PM  Consent given by: patient  Site marked: site marked  Timeout: Immediately prior to procedure a time out was called to verify the correct patient, procedure, equipment, support staff and site/side marked as required   Supporting Documentation  Indications: pain   Procedure Details  Location: knee - R knee  Preparation: Patient  was prepped and draped in the usual sterile fashion  Needle gauge: 21G.  Approach: anterolateral  Medications administered: 2 mL lidocaine (cardiac); 80 mg triamcinolone acetonide 40 MG/ML  Patient tolerance: patient tolerated the procedure well with no immediate complications    Large Joint Arthrocentesis: L knee  Date/Time: 1/10/2022 2:40 PM  Consent given by: patient  Site marked: site marked  Timeout: Immediately prior to procedure a time out was called to verify the correct patient, procedure, equipment, support staff and site/side marked as required   Supporting Documentation  Indications: pain   Procedure Details  Location: knee - L knee  Preparation: Patient was prepped and draped in the usual sterile fashion  Needle gauge: 21G.  Approach: anterolateral  Medications administered: 2 mL lidocaine (cardiac); 80 mg triamcinolone acetonide 40 MG/ML  Patient tolerance: patient tolerated the procedure well with no immediate complications          Kevin Serrano MD      Symptoms

## 2023-02-28 NOTE — ED ADULT TRIAGE NOTE - CHIEF COMPLAINT QUOTE
Patient presents to ED with c/o hematuria since today.  Patient with chronic Cooper catheter since 1/19/23 secondary to prostate issues.  Bloody urine noted in leg bag.

## 2023-02-28 NOTE — CONSULT NOTE ADULT - SUBJECTIVE AND OBJECTIVE BOX
HPI: The patient is a 67 year old male presents with desir catheter in place now with jesus hematuria. The patient seen our Urologist Dr Anne and placed a desir last one 2 weeks ago for BPH and supposed to have surgery next month. ER removed the desir patient arrived with and attempted to exchange, and then unsuccessful on placement.       PAST MEDICAL & SURGICAL HISTORY:  HTN (hypertension)      Atrial fibrillation          MEDICATIONS  (STANDING):  sodium chloride 0.9% Bolus 2000 milliLiter(s) IV Bolus once    MEDICATIONS  (PRN):      Allergies    Allergy Status Unknown    Intolerances    epinephrine (Other)      SOCIAL HISTORY:    FAMILY HISTORY:  Family history of CVA (Father, Mother)        Vital Signs Last 24 Hrs  T(C): 36.8 (28 Feb 2023 20:05), Max: 36.8 (28 Feb 2023 20:05)  T(F): 98.3 (28 Feb 2023 20:05), Max: 98.3 (28 Feb 2023 20:05)  HR: 83 (28 Feb 2023 20:05) (83 - 83)  BP: 155/105 (28 Feb 2023 20:05) (155/105 - 155/105)  BP(mean): --  RR: 18 (28 Feb 2023 20:05) (18 - 18)  SpO2: 98% (28 Feb 2023 20:05) (98% - 98%)    Parameters below as of 28 Feb 2023 20:05  Patient On (Oxygen Delivery Method): room air        PHYSICAL EXAM:    Constitutional: in good spirits, in no distress    Respiratory: no respiratory distress, no dyspnea, no supplemental o2 needed    Genitourinary: 22 coude draining raspberry thin colored output    Neurological: A&OX3          LABS:                        12.3   9.63  )-----------( 284      ( 28 Feb 2023 22:11 )             37.4     02-28    139  |  105  |  16.2  ----------------------------<  109<H>  3.8   |  24.0  |  0.60    Ca    8.8      28 Feb 2023 22:11    TPro  6.4<L>  /  Alb  3.7  /  TBili  0.3<L>  /  DBili  x   /  AST  23  /  ALT  26  /  AlkPhos  70  02-28          RADIOLOGY & ADDITIONAL STUDIES:

## 2023-02-28 NOTE — CONSULT NOTE ADULT - ASSESSMENT
Patient is a known to Dr. Anne, going for surgery later this month, has chronic desir with intermittent hematuria. Patient was in his usual state of health, desir draining with no issues and then noticed hematuria started, cleared and then started again which prompted him to go to the ER. The ER removed the desir and then unable to replace. I was able to place a 22 coude easily on first pass, 175cc of thin raspberry drained on insertion. I discussed plan with the patient, will hydrate, ensure that he does not go into clot retention and can be discharged home and call Dr. Anne in the morning. I also explained that if he does go into retention I would recommend staying overnight and allow me to observe and irrigate if needed. Patient and wife okay with plan. Dr. Jimenes aware of plan

## 2023-02-28 NOTE — ED PROVIDER NOTE - PATIENT PORTAL LINK FT
You can access the FollowMyHealth Patient Portal offered by [facility] by registering at the following website: [email or web address]. By joining [employer]’s FollowMyHealth portal, you will also be able to view your health information using other applications (apps) compatible with our system.

## 2023-02-28 NOTE — ED ADULT TRIAGE NOTE - TEMPERATURE IN CELSIUS (DEGREES C)
This patient may be discharged home from asu when criteria is met   Follow up with Dr Wright as planned   no bathing, no sex, nothing in the vagina x at least 3 weeks   no heavy lifting pushing or pulling   take pain medication as prescribed 36.8 This patient may be discharged home from asu when criteria is met   Follow up with Dr Wright as planned   no bathing, no sex, nothing in the vagina x at least 3 weeks   no heavy lifting pushing or pulling   take extra strength tylenol and motrin around the clock for relief of pain Follow up with Dr Wright as planned in 1-2 weeks.   no bathing, no sex, nothing in the vagina x 6 weeks   no heavy lifting pushing or pulling x6 weeks   take extra strength tylenol and motrin around the clock for relief of pain  OK to shower after 48hrs, allow soapy water to run over abdomen and pat dry.   Do not drive if taking prescribed narcotic pain medications.     Please notify MD sooner or return to the ER with any new or worsening symptoms, uncontrollable pain, foul smelling discharge or drainage from wound, excessive bleeding or swelling, fever >101, chest pain, shortness of breath, abdominal pain, nausea/vomiting, or other concerns/problems.

## 2023-02-28 NOTE — ED PROVIDER NOTE - CARDIOVASCULAR NEGATIVE STATEMENT, MLM
no chest pain and no edema. You can access the FollowMyHealth Patient Portal offered by [facility] by registering at the following website: [email or web address]. By joining [employer]’s FollowMyHealth portal, you will also be able to view your health information using other applications (apps) compatible with our system.

## 2023-02-28 NOTE — ED PROVIDER NOTE - CLINICAL SUMMARY MEDICAL DECISION MAKING FREE TEXT BOX
The patient presents with hematuria with desir for BPH and seen by Urolgoy and changed the desir and will dc home as the urine cleared up and has mild UTI  The patient appears well

## 2023-03-01 ENCOUNTER — NON-APPOINTMENT (OUTPATIENT)
Age: 68
End: 2023-03-01

## 2023-03-01 LAB
BACTERIA # UR AUTO: ABNORMAL
EPI CELLS # UR: SIGNIFICANT CHANGE UP
RAPID RVP RESULT: SIGNIFICANT CHANGE UP
RBC CASTS # UR COMP ASSIST: >50 /HPF (ref 0–4)
SARS-COV-2 RNA SPEC QL NAA+PROBE: SIGNIFICANT CHANGE UP
WBC UR QL: ABNORMAL /HPF (ref 0–5)

## 2023-03-01 PROCEDURE — 0225U NFCT DS DNA&RNA 21 SARSCOV2: CPT

## 2023-03-01 PROCEDURE — 86850 RBC ANTIBODY SCREEN: CPT

## 2023-03-01 PROCEDURE — 86900 BLOOD TYPING SEROLOGIC ABO: CPT

## 2023-03-01 PROCEDURE — 80053 COMPREHEN METABOLIC PANEL: CPT

## 2023-03-01 PROCEDURE — 51702 INSERT TEMP BLADDER CATH: CPT

## 2023-03-01 PROCEDURE — 86901 BLOOD TYPING SEROLOGIC RH(D): CPT

## 2023-03-01 PROCEDURE — 85025 COMPLETE CBC W/AUTO DIFF WBC: CPT

## 2023-03-01 PROCEDURE — 87186 SC STD MICRODIL/AGAR DIL: CPT

## 2023-03-01 PROCEDURE — 36415 COLL VENOUS BLD VENIPUNCTURE: CPT

## 2023-03-01 PROCEDURE — 87086 URINE CULTURE/COLONY COUNT: CPT

## 2023-03-01 PROCEDURE — 96360 HYDRATION IV INFUSION INIT: CPT | Mod: XU

## 2023-03-01 PROCEDURE — 99284 EMERGENCY DEPT VISIT MOD MDM: CPT | Mod: 25

## 2023-03-01 PROCEDURE — 81001 URINALYSIS AUTO W/SCOPE: CPT

## 2023-03-01 RX ORDER — CEPHALEXIN 500 MG
500 CAPSULE ORAL ONCE
Refills: 0 | Status: COMPLETED | OUTPATIENT
Start: 2023-03-01 | End: 2023-03-01

## 2023-03-01 RX ORDER — CEPHALEXIN 500 MG
1 CAPSULE ORAL
Qty: 21 | Refills: 0
Start: 2023-03-01 | End: 2023-03-07

## 2023-03-01 RX ADMIN — Medication 500 MILLIGRAM(S): at 00:39

## 2023-03-01 NOTE — ED ADULT NURSE NOTE - OBJECTIVE STATEMENT
pt presents to ed with hematuria.  pt with desir that was placed in January 2/2 prostates issues.  bloody urine in bag, no visible clots.  pt denies any complaints.  NAD at this time.  urology at bedside for desir exchange.  NAD at this time.

## 2023-03-01 NOTE — ED ADULT NURSE NOTE - IN THE PAST 12 MONTHS HAVE YOU USED DRUGS OTHER THAN THOSE REQUIRED FOR MEDICAL REASON?
Blood pressure is at goal   A1c is elevated  Emphasized importance of stricter control of diabetes  Continue lisinopril  No

## 2023-03-02 ENCOUNTER — APPOINTMENT (OUTPATIENT)
Dept: UROLOGY | Facility: CLINIC | Age: 68
End: 2023-03-02
Payer: COMMERCIAL

## 2023-03-02 PROCEDURE — 51700 IRRIGATION OF BLADDER: CPT

## 2023-03-02 PROCEDURE — A4216: CPT | Mod: NC

## 2023-03-05 ENCOUNTER — EMERGENCY (EMERGENCY)
Facility: HOSPITAL | Age: 68
LOS: 1 days | Discharge: HOME | End: 2023-03-05
Attending: EMERGENCY MEDICINE
Payer: COMMERCIAL

## 2023-03-05 VITALS
HEART RATE: 79 BPM | SYSTOLIC BLOOD PRESSURE: 163 MMHG | OXYGEN SATURATION: 100 % | RESPIRATION RATE: 16 BRPM | HEIGHT: 69 IN | WEIGHT: 149.91 LBS | TEMPERATURE: 98 F | DIASTOLIC BLOOD PRESSURE: 91 MMHG

## 2023-03-05 VITALS
HEART RATE: 78 BPM | SYSTOLIC BLOOD PRESSURE: 150 MMHG | RESPIRATION RATE: 18 BRPM | OXYGEN SATURATION: 98 % | DIASTOLIC BLOOD PRESSURE: 78 MMHG

## 2023-03-05 PROCEDURE — 99284 EMERGENCY DEPT VISIT MOD MDM: CPT

## 2023-03-05 PROCEDURE — 99283 EMERGENCY DEPT VISIT LOW MDM: CPT

## 2023-03-05 PROCEDURE — 51702 INSERT TEMP BLADDER CATH: CPT

## 2023-03-05 RX ORDER — FAMOTIDINE 10 MG/ML
40 INJECTION INTRAVENOUS ONCE
Refills: 0 | Status: COMPLETED | OUTPATIENT
Start: 2023-03-05 | End: 2023-03-05

## 2023-03-05 RX ORDER — ONDANSETRON 8 MG/1
4 TABLET, FILM COATED ORAL ONCE
Refills: 0 | Status: COMPLETED | OUTPATIENT
Start: 2023-03-05 | End: 2023-03-05

## 2023-03-05 RX ORDER — CIPROFLOXACIN LACTATE 400MG/40ML
500 VIAL (ML) INTRAVENOUS EVERY 12 HOURS
Refills: 0 | Status: DISCONTINUED | OUTPATIENT
Start: 2023-03-05 | End: 2023-03-12

## 2023-03-05 RX ORDER — CIPROFLOXACIN LACTATE 400MG/40ML
1 VIAL (ML) INTRAVENOUS
Qty: 14 | Refills: 0
Start: 2023-03-05 | End: 2023-03-11

## 2023-03-05 RX ADMIN — ONDANSETRON 4 MILLIGRAM(S): 8 TABLET, FILM COATED ORAL at 11:28

## 2023-03-05 RX ADMIN — FAMOTIDINE 40 MILLIGRAM(S): 10 INJECTION INTRAVENOUS at 11:29

## 2023-03-05 RX ADMIN — Medication 500 MILLIGRAM(S): at 11:29

## 2023-03-05 RX ADMIN — Medication 50 MILLIGRAM(S): at 11:28

## 2023-03-05 NOTE — ED PROVIDER NOTE - OBJECTIVE STATEMENT
66yo M w/pmh HTN, afib, BPH, chronic desir catheter presents for desir obstruction and allergic reaction. Reports he was seen in this ER 2/28-3/1, given keflex for UTI. No reaction at that time, took a second dose at 9pm last night, felt nauseous again and woke up this morning with eyelid swelling. Denies edema of tongue or throat, SOB, CP, abdominal pain, rash. Also reports that they replaced his desir catheter at that time, now feels it is obstructed and abdomen is distended. Denies hematuria, dysuria. 66yo M w/pmh HTN, afib, BPH, chronic desir catheter presents for desir obstruction and allergic reaction. Reports he was seen in this ER 2/28-3/1, given keflex for UTI. No reaction at that time, continued medication at home, most recent dose at 9pm last night, felt nauseous again and woke up this morning with eyelid swelling. Denies edema of tongue or throat, SOB, CP, abdominal pain, rash. Also reports that they replaced his desir catheter at that time, now feels it is obstructed and abdomen is distended. Denies hematuria, dysuria.

## 2023-03-05 NOTE — ED PROVIDER NOTE - PHYSICAL EXAMINATION
General: well appearing, NAD  Head: NC/AT, mild edema of R upper eyelid  Cardiac: RRR, no m/r/g  Respiratory: CTABL, equal chest wall expansions, no conversational dyspnea  Abdomen: soft, suprapubic distention, +ttp  Neuro: AAOx3, coordinated movement of all 4 extremities  Psych: calm, cooperative, normal affect  Skin: warm and dry

## 2023-03-05 NOTE — ED PROVIDER NOTE - ATTENDING CONTRIBUTION TO CARE
Cedric: I performed a face to face evaluation of this patient and performed a full history and physical examination on the patient.  I agree with the resident's history, physical examination, and plan of the patient unless otherwise noted. My brief assessment is as follows: pmh as documented, recent ED visit, desir replaced, placed on keflex, took only 2 total doses, withs ome vomiting and swellign to right eyelid and attributes allergy to med (previously documented as allergic to keflex). also with supraubic fullness/discomfort, states has been progressive for several days. has had urine in bag without blood though states its less then he expects based on how much be drinks. emptied it this morning and with several hundred ml yellow urine in bag currently but suprapubic fullness/mild ttp. no back pain. no f/c. culture shored enterococcus sensitive to quinolones. will change abx. flush/replace desir as needed. reassess.

## 2023-03-05 NOTE — ED PROVIDER NOTE - NS ED ROS FT
Constitutional: no fever, no sweats, and no chills.  CV: no chest pain, no edema.  Resp: no cough, no dyspnea  GI: +abdominal pain, +nausea and no vomiting.  MSK: no msk pain, no weakness  Skin: no lesions, and +rashes.  Neuro: no LOC, no headache, no dizziness  ROS otherwise negative except as noted in HPI.

## 2023-03-05 NOTE — ED PROVIDER NOTE - PATIENT PORTAL LINK FT
You can access the FollowMyHealth Patient Portal offered by [facility] by registering at the following website: [email or web address]. By joining [employer]’s FollowMyHealth portal, you will also be able to view your health information using other applications (apps) compatible with our system.

## 2023-03-05 NOTE — ED PROVIDER NOTE - CLINICAL SUMMARY MEDICAL DECISION MAKING FREE TEXT BOX
66yo M w/pmh HTN, afib, BPH, chronic desir catheter presents for desir obstruction and allergic reaction.  Mild edema of R upper eyelid on exam, no other swelling. Famotidine, zofran, prednisone for symptoms. Plan for flushing desir catheter, replacement if it fails. Will switch antibiotic to ciprofloxacin based on cultures and allergies. 66yo M w/pmh HTN, afib, BPH, chronic desir catheter presents for desir obstruction and allergic reaction.  Mild edema of R upper eyelid on exam, no other swelling. Famotidine, zofran, prednisone for symptoms. Plan for flushing desir catheter, replacement if it fails. Will switch antibiotic to ciprofloxacin based on cultures and allergies.    Desir replaced, with hematuria output of over 600mL. Patient feels much better. Discussed outpatient antibiotics, okay for discharge with outpatient follow-up and strict return precautions.

## 2023-03-05 NOTE — ED ADULT TRIAGE NOTE - CHIEF COMPLAINT QUOTE
Patient ambulated into ED with steady gait, Pt c/o was given Keflex took 3 days of it and started vomiting on 3rd day and swelling to eyes, denies any throat. tongue swelling or discomfort denies any respiratory reactions. Still having burning.

## 2023-03-05 NOTE — ED ADULT NURSE NOTE - OBJECTIVE STATEMENT
PT BIB wife c/o abd distension hx htn afib bph chronic desir recently being treated for a uti - in no acute distress pending md alejandra dickson rn

## 2023-03-05 NOTE — ED STATDOCS - PROGRESS NOTE DETAILS
67 hx of HTN, A-fib, BPH, and multiple visits for urinary retention here w/ abdominal distention, nausea, and eye swelling after starting Keflex. Catheter placed by urology a few days ago. Abdomen still distended. Call Dr. Fairchild's office; will come to ER. Has had US with over 800 cc's in bladder. Send to main for further eval.

## 2023-03-05 NOTE — ED PROVIDER NOTE - NSFOLLOWUPINSTRUCTIONS_ED_ALL_ED_FT
- Take benadryl for any rash or return of the eyelid swelling. Return to the ER immediately for swelling of the tongue or throat.  - Take your antibiotics as prescribed: 1 tablet, twice per day for 7 days.   - Follow up with your doctor within 2-3 days.   - Follow up with your urologist at the next available appointment.   - Return to the ED for any new or worsening symptoms.     Urinary Tract Infection    A urinary tract infection (UTI) is an infection of any part of the urinary tract, which includes the kidneys, ureters, bladder, and urethra. Risk factors include ignoring your need to urinate, wiping back to front if female, being an uncircumcised male, and having diabetes or a weak immune system. Symptoms include frequent urination, pain or burning with urination, foul smelling urine, cloudy urine, pain in the lower abdomen, blood in the urine, and fever. If you were prescribed an antibiotic medicine, take it as told by your health care provider. Do not stop taking the antibiotic even if you start to feel better.    SEEK IMMEDIATE MEDICAL CARE IF YOU HAVE ANY OF THE FOLLOWING SYMPTOMS: severe back or abdominal pain, fever, inability to keep fluids or medicine down, dizziness/lightheadedness, or a change in mental status.         Allergic Reaction    An allergic reaction is an abnormal reaction to a substance (allergen) by the body's defense system. Common allergens include medicines, food, insect bites or stings, and blood products. The body releases certain proteins into the blood that can cause a variety of symptoms such as an itchy rash, wheezing, swelling of the face/lips/tongue/throat, abdominal pain, nausea or vomiting. An allergic reaction is usually treated with medication. If your health care provider prescribed you an epinephrine injection device, make sure to keep it with you at all times.    SEEK IMMEDIATE MEDICAL CARE IF YOU HAVE ANY OF THE FOLLOWING SYMPTOMS: allergic reaction severe enough that required you to use epinephrine, tightness in your chest, swelling around your lips/tongue/throat, abdominal pain, vomiting or diarrhea, or lightheadedness/dizziness. These symptoms may represent a serious problem that is an emergency. Do not wait to see if the symptoms will go away. Use your auto-injector pen or anaphylaxis kit as you have been instructed. Call 911 and do not drive yourself to the hospital.

## 2023-03-05 NOTE — ED ADULT NURSE NOTE - NSFALLRSKASSESASSIST_ED_ALL_ED
Anesthesia Evaluation      Patient summary reviewed   History of anesthetic complications (PONV, severe, after every single procedure)     Airway   Mallampati: II  Neck ROM: full   Pulmonary - normal exam   (+) asthma                           Cardiovascular   Exercise tolerance: > or = 4 METS  Rhythm: regular  Rate: normal,         Neuro/Psych    (+) neuromuscular disease,  CVA ,     Endo/Other    (+) obesity,      GI/Hepatic/Renal    (+) GERD well controlled,             Dental - normal exam                        Anesthesia Plan  Planned anesthetic: general endotracheal and total IV anesthesia  TIVA,   Zofran 4  Decadron 10  Propofol infusion please  Scopolamine patch preop  ASA 2   Induction: intravenous   Anesthetic plan and risks discussed with: patient  Anesthesia plan special considerations: antiemetics,   Post-op plan: routine recovery          
no

## 2023-03-06 ENCOUNTER — APPOINTMENT (OUTPATIENT)
Dept: UROLOGY | Facility: CLINIC | Age: 68
End: 2023-03-06
Payer: COMMERCIAL

## 2023-03-06 VITALS
SYSTOLIC BLOOD PRESSURE: 163 MMHG | HEIGHT: 68 IN | WEIGHT: 160 LBS | HEART RATE: 75 BPM | DIASTOLIC BLOOD PRESSURE: 100 MMHG | BODY MASS INDEX: 24.25 KG/M2

## 2023-03-06 PROCEDURE — 99214 OFFICE O/P EST MOD 30 MIN: CPT

## 2023-03-07 NOTE — ASSESSMENT
[FreeTextEntry1] : \par \par plan:\par - will go for PSA this week\par - if high, will need to repeat again after assuming it might have been the infection and manipulation\par - will update result over the phone \par - if hematuria, irritation and clogging continue, will consider SPT

## 2023-03-07 NOTE — HISTORY OF PRESENT ILLNESS
[FreeTextEntry1] : 68 yo M\par here with wife\par known large prostate ~ 300 gr\par with desir catheter and planned for possible LEP\par still waiting for PSA to determine if he needs any further work up before surgery\par in the past several weeks had several complications with the desir, has been in the ER and office several times due to hematuria or clogging of the desir. currently with a 20F 3 way.\par \par again, discussed the plan with the patient\par last time was also diagnosed with UTI and treated with abx with improvement\par still needs PSA before planning surgery, understands the PSA may be elevated now due to the multiple recent manipulations and UTI. will try and have the test this week, and if elevated will have to wait and repeat it before moving forward.\par also discussed the possible need for an MRI, based on the results, still will calculate PSA density and determine the need based on that. he is expected to have a higher than average PSA just because of the size of the prostate\par \par \par plan:\par - will go for PSA this week\par - if high, will need to repeat again after assuming it might have been the infection and manipulation\par - will update result over the phone \par - if hematuria, irritation and clogging continue, will consider SPT

## 2023-03-16 ENCOUNTER — APPOINTMENT (OUTPATIENT)
Dept: UROLOGY | Facility: CLINIC | Age: 68
End: 2023-03-16
Payer: COMMERCIAL

## 2023-03-16 VITALS — OXYGEN SATURATION: 99 % | HEART RATE: 63 BPM | SYSTOLIC BLOOD PRESSURE: 189 MMHG | DIASTOLIC BLOOD PRESSURE: 106 MMHG

## 2023-03-16 PROCEDURE — 51702 INSERT TEMP BLADDER CATH: CPT

## 2023-03-16 RX ORDER — LEVOFLOXACIN 500 MG/1
500 TABLET, FILM COATED ORAL DAILY
Qty: 7 | Refills: 0 | Status: DISCONTINUED | COMMUNITY
Start: 2023-01-30 | End: 2023-03-16

## 2023-03-17 ENCOUNTER — EMERGENCY (EMERGENCY)
Facility: HOSPITAL | Age: 68
LOS: 1 days | Discharge: HOME | End: 2023-03-17
Attending: STUDENT IN AN ORGANIZED HEALTH CARE EDUCATION/TRAINING PROGRAM
Payer: COMMERCIAL

## 2023-03-17 VITALS
TEMPERATURE: 98 F | OXYGEN SATURATION: 99 % | HEART RATE: 61 BPM | DIASTOLIC BLOOD PRESSURE: 90 MMHG | SYSTOLIC BLOOD PRESSURE: 159 MMHG | WEIGHT: 160.06 LBS | HEIGHT: 69 IN | RESPIRATION RATE: 18 BRPM

## 2023-03-17 LAB
APPEARANCE UR: ABNORMAL
BACTERIA # UR AUTO: ABNORMAL
BASOPHILS # BLD AUTO: 0.04 K/UL — SIGNIFICANT CHANGE UP (ref 0–0.2)
BASOPHILS NFR BLD AUTO: 0.3 % — SIGNIFICANT CHANGE UP (ref 0–2)
BILIRUB UR-MCNC: NEGATIVE — SIGNIFICANT CHANGE UP
COLOR SPEC: YELLOW — SIGNIFICANT CHANGE UP
DIFF PNL FLD: ABNORMAL
EOSINOPHIL # BLD AUTO: 0.17 K/UL — SIGNIFICANT CHANGE UP (ref 0–0.5)
EOSINOPHIL NFR BLD AUTO: 1.4 % — SIGNIFICANT CHANGE UP (ref 0–6)
EPI CELLS # UR: ABNORMAL
GLUCOSE UR QL: NEGATIVE — SIGNIFICANT CHANGE UP
HCT VFR BLD CALC: 38.7 % — LOW (ref 39–50)
HGB BLD-MCNC: 13.2 G/DL — SIGNIFICANT CHANGE UP (ref 13–17)
IMM GRANULOCYTES NFR BLD AUTO: 0.5 % — SIGNIFICANT CHANGE UP (ref 0–0.9)
KETONES UR-MCNC: NEGATIVE — SIGNIFICANT CHANGE UP
LEUKOCYTE ESTERASE UR-ACNC: ABNORMAL
LYMPHOCYTES # BLD AUTO: 1.41 K/UL — SIGNIFICANT CHANGE UP (ref 1–3.3)
LYMPHOCYTES # BLD AUTO: 11.3 % — LOW (ref 13–44)
MAGNESIUM SERPL-MCNC: 2.1 MG/DL — SIGNIFICANT CHANGE UP (ref 1.6–2.6)
MCHC RBC-ENTMCNC: 29.9 PG — SIGNIFICANT CHANGE UP (ref 27–34)
MCHC RBC-ENTMCNC: 34.1 GM/DL — SIGNIFICANT CHANGE UP (ref 32–36)
MCV RBC AUTO: 87.6 FL — SIGNIFICANT CHANGE UP (ref 80–100)
MONOCYTES # BLD AUTO: 0.83 K/UL — SIGNIFICANT CHANGE UP (ref 0–0.9)
MONOCYTES NFR BLD AUTO: 6.6 % — SIGNIFICANT CHANGE UP (ref 2–14)
NEUTROPHILS # BLD AUTO: 9.99 K/UL — HIGH (ref 1.8–7.4)
NEUTROPHILS NFR BLD AUTO: 79.9 % — HIGH (ref 43–77)
NITRITE UR-MCNC: POSITIVE
PH UR: 6 — SIGNIFICANT CHANGE UP (ref 5–8)
PLATELET # BLD AUTO: 338 K/UL — SIGNIFICANT CHANGE UP (ref 150–400)
PROT UR-MCNC: 100
RBC # BLD: 4.42 M/UL — SIGNIFICANT CHANGE UP (ref 4.2–5.8)
RBC # FLD: 11.9 % — SIGNIFICANT CHANGE UP (ref 10.3–14.5)
RBC CASTS # UR COMP ASSIST: >50 /HPF (ref 0–4)
SP GR SPEC: 1.02 — SIGNIFICANT CHANGE UP (ref 1.01–1.02)
UROBILINOGEN FLD QL: NEGATIVE — SIGNIFICANT CHANGE UP
WBC # BLD: 12.5 K/UL — HIGH (ref 3.8–10.5)
WBC # FLD AUTO: 12.5 K/UL — HIGH (ref 3.8–10.5)
WBC UR QL: ABNORMAL /HPF (ref 0–5)

## 2023-03-17 PROCEDURE — 99285 EMERGENCY DEPT VISIT HI MDM: CPT

## 2023-03-17 RX ORDER — SODIUM CHLORIDE 9 MG/ML
1000 INJECTION INTRAMUSCULAR; INTRAVENOUS; SUBCUTANEOUS ONCE
Refills: 0 | Status: COMPLETED | OUTPATIENT
Start: 2023-03-17 | End: 2023-03-17

## 2023-03-17 RX ADMIN — SODIUM CHLORIDE 1000 MILLILITER(S): 9 INJECTION INTRAMUSCULAR; INTRAVENOUS; SUBCUTANEOUS at 23:26

## 2023-03-17 NOTE — ED STATDOCS - NS ED MD DISPO DISCHARGE CCDA
Only spit up 1 x has several feedings since has been doing well, mom will observe and f/u prn Patient/Caregiver provided printed discharge information.

## 2023-03-17 NOTE — ED STATDOCS - NS ED ATTENDING STATEMENT MOD
This was a shared visit with the FRANCISCO J. I reviewed and verified the documentation and independently performed the documented:

## 2023-03-17 NOTE — ED STATDOCS - PHYSICAL EXAMINATION
Gen: NAD, non-toxic, conversational  Eyes: PERRLA, EOMI   HENT: Normocephalic, atraumatic. External ears normal, no rhinorrhea, moist mucous membranes.   CV: RRR, no M/R/G  Resp: CTAB, non-labored, speaking without difficulty on room air  Abd: soft, non tender, non rigid, no guarding or rebound tenderness  Back: No CVAT bilaterally, no midline ttp  Skin: dry, wwp   Neuro: AOx3, speech is fluent and appropriate  Psych: Mood ok, affect euthymic  : Chaperone: ED Timo Grace. Distended bladder, firm in suprapubic region

## 2023-03-17 NOTE — ED STATDOCS - ATTENDING APP SHARED VISIT CONTRIBUTION OF CARE
SCOTT Bach: see mdm    I have personally performed a face to face diagnostic evaluation on this patient.  I have reviewed the ACP note and agree with the history, exam, and plan of care, except as noted.   My medical decision making and observations are found above.

## 2023-03-17 NOTE — ED ADULT TRIAGE NOTE - HEIGHT IN CM
I left a voicemail for pt that we have an opening this Thursday 11/14 at 1pm that she could come in or else to find her an earlier appt than May.    Janet Kramer MA    
Patient is requesting sooner appointment with Rheumatology. Presently scheduled for June 2020 patient would like to be seen for psoriatic arthritis. She can be reached at  [phone].  
175.26

## 2023-03-17 NOTE — ED STATDOCS - NSFOLLOWUPINSTRUCTIONS_ED_ALL_ED_FT
- Follow up with Omid  - Return to ED with new or worsening symptoms    Urinary Tract Infection    A urinary tract infection (UTI) is an infection of any part of the urinary tract, which includes the kidneys, ureters, bladder, and urethra. Risk factors include ignoring your need to urinate, wiping back to front if female, being an uncircumcised male, and having diabetes or a weak immune system. Symptoms include frequent urination, pain or burning with urination, foul smelling urine, cloudy urine, pain in the lower abdomen, blood in the urine, and fever. If you were prescribed an antibiotic medicine, take it as told by your health care provider. Do not stop taking the antibiotic even if you start to feel better.    SEEK IMMEDIATE MEDICAL CARE IF YOU HAVE ANY OF THE FOLLOWING SYMPTOMS: severe back or abdominal pain, fever, inability to keep fluids or medicine down, dizziness/lightheadedness, or a change in mental status.

## 2023-03-17 NOTE — ED STATDOCS - OBJECTIVE STATEMENT
68 y/o male with PMHx of BPH, HTN presents to the ED c/o blocked Cooper urinary catheter for less than 6 hours with urinary retention, abdominal distention, with mild suprapubic discomfort. Wife attempted to irrigate the catheter without success. Wife reporting that the Cooper catheter was recently changed one day ago by Dr. Anne, urology. Pt is experiencing recurrent blockages in the Cooper catheter since January without a surgery date for prostatectomy due to frequent blood clots. Allergic to Augmentin, epinephrine, keflex.

## 2023-03-17 NOTE — ED ADULT TRIAGE NOTE - CHIEF COMPLAINT QUOTE
c/o of clogged desir. Report tried to clear at home but is c/o of abd distention, pain and very little urine output in desir. Report had it changed yesterday at urologist c/o of clogged desir. Report tried to clear at home but unsuccessful. c/o of abd distention, pain and very little urine output in desir. Report had desir changed yesterday at urologist

## 2023-03-17 NOTE — ED STATDOCS - CLINICAL SUMMARY MEDICAL DECISION MAKING FREE TEXT BOX
68 y/o male with PMHx of BPH, HTN follows with Dr. Anne from Urology c/o no output in his Cooper. Tried to irrigate at home without any urine passage due to small clots visible inside Cooper. Attempted to irrigate here without ability to get fluids out 18 [place] Cooper with output greater than 1L in the first 10 minutes. Will check basic labs to check creatinine, US renal and bladder to check for blood clots. Concern for Cooper to block again as Cooper went down from 20 [place] down to 18 [place]. Discussed with Dr. Anne colleague over the phone. Likely safe for discharge with close urology follow up with negative lab results. follow up studies, reassess, dispo. Pending possible antibiotics if possible UTI.

## 2023-03-17 NOTE — ED STATDOCS - PATIENT PORTAL LINK FT
You can access the FollowMyHealth Patient Portal offered by [facility] by registering at the following website: [email or web address]. By joining [email or web address]’s FollowMyHealth portal, you will also be able to view your health information using other applications (apps) compatible with our system.

## 2023-03-17 NOTE — ED STATDOCS - NS ED ROS FT
General: No fevers / chills  HENT: No ear pain, runny nose, or sore throat  Eyes: No visual changes  CP: No chest pain, palpitations, or light headedness  Resp: No shortness of breath, no cough  GI: (+) Abdominal distention and discomfort. No diarrhea, constipation, nausea, or vomiting  : (+) Urine retention. No dysuria or hematuria  Neuro: No numbness, tingling, or weakness  Endo: No hx of diabetes  Heme: No hx of easy bleeding or bruising

## 2023-03-17 NOTE — ED STATDOCS - CARE PROVIDER_API CALL
Martin Anne)  Urology  [address], [address]  [place] [address]  Phone: [phone]  Fax: [phone]  Follow Up Time:

## 2023-03-18 LAB
ALBUMIN SERPL ELPH-MCNC: 3.8 G/DL — SIGNIFICANT CHANGE UP (ref 3.3–5.2)
ALP SERPL-CCNC: 75 U/L — SIGNIFICANT CHANGE UP (ref 40–120)
ALT FLD-CCNC: 18 U/L — SIGNIFICANT CHANGE UP
ANION GAP SERPL CALC-SCNC: 13 MMOL/L — SIGNIFICANT CHANGE UP (ref 5–17)
AST SERPL-CCNC: 19 U/L — SIGNIFICANT CHANGE UP
BILIRUB SERPL-MCNC: 0.3 MG/DL — LOW (ref 0.4–2)
BUN SERPL-MCNC: 14.5 MG/DL — SIGNIFICANT CHANGE UP (ref 8–20)
CALCIUM SERPL-MCNC: 9.1 MG/DL — SIGNIFICANT CHANGE UP (ref 8.4–10.5)
CHLORIDE SERPL-SCNC: 100 MMOL/L — SIGNIFICANT CHANGE UP (ref 96–108)
CO2 SERPL-SCNC: 24 MMOL/L — SIGNIFICANT CHANGE UP (ref 22–29)
CREAT SERPL-MCNC: 0.53 MG/DL — SIGNIFICANT CHANGE UP (ref 0.5–1.3)
EGFR: 110 ML/MIN/1.73M2 — SIGNIFICANT CHANGE UP
GLUCOSE SERPL-MCNC: 114 MG/DL — HIGH (ref 70–99)
POTASSIUM SERPL-MCNC: 3.7 MMOL/L — SIGNIFICANT CHANGE UP (ref 3.5–5.3)
POTASSIUM SERPL-SCNC: 3.7 MMOL/L — SIGNIFICANT CHANGE UP (ref 3.5–5.3)
PROT SERPL-MCNC: 6.6 G/DL — SIGNIFICANT CHANGE UP (ref 6.6–8.7)
SODIUM SERPL-SCNC: 137 MMOL/L — SIGNIFICANT CHANGE UP (ref 135–145)

## 2023-03-18 PROCEDURE — 36415 COLL VENOUS BLD VENIPUNCTURE: CPT

## 2023-03-18 PROCEDURE — 81001 URINALYSIS AUTO W/SCOPE: CPT

## 2023-03-18 PROCEDURE — 76775 US EXAM ABDO BACK WALL LIM: CPT | Mod: 26

## 2023-03-18 PROCEDURE — 87086 URINE CULTURE/COLONY COUNT: CPT

## 2023-03-18 PROCEDURE — 85025 COMPLETE CBC W/AUTO DIFF WBC: CPT

## 2023-03-18 PROCEDURE — 99284 EMERGENCY DEPT VISIT MOD MDM: CPT | Mod: 25

## 2023-03-18 PROCEDURE — 80053 COMPREHEN METABOLIC PANEL: CPT

## 2023-03-18 PROCEDURE — 51702 INSERT TEMP BLADDER CATH: CPT | Mod: XU

## 2023-03-18 PROCEDURE — 87186 SC STD MICRODIL/AGAR DIL: CPT

## 2023-03-18 PROCEDURE — 96360 HYDRATION IV INFUSION INIT: CPT | Mod: XU

## 2023-03-18 PROCEDURE — 83735 ASSAY OF MAGNESIUM: CPT

## 2023-03-18 PROCEDURE — 76775 US EXAM ABDO BACK WALL LIM: CPT

## 2023-03-18 RX ORDER — CIPROFLOXACIN LACTATE 400MG/40ML
1 VIAL (ML) INTRAVENOUS
Qty: 14 | Refills: 0
Start: 2023-03-18 | End: 2023-03-24

## 2023-03-18 RX ORDER — CIPROFLOXACIN LACTATE 400MG/40ML
500 VIAL (ML) INTRAVENOUS ONCE
Refills: 0 | Status: COMPLETED | OUTPATIENT
Start: 2023-03-18 | End: 2023-03-18

## 2023-03-18 RX ADMIN — Medication 500 MILLIGRAM(S): at 01:13

## 2023-03-18 NOTE — ED ADULT NURSE NOTE - CHIEF COMPLAINT QUOTE
c/o of clogged desir. Report tried to clear at home but unsuccessful. c/o of abd distention, pain and very little urine output in desir. Report had desir changed yesterday at urologist

## 2023-03-18 NOTE — CONSULT NOTE ADULT - SUBJECTIVE AND OBJECTIVE BOX
UROLOGY CONSULT: Consult requested as patient is a patient of Dr Jefferson and presented with retention, despite desir. ED replaced the desir, some clots were seen, now it is actively draining clear yellow urine. He was in Dr Jefferson office yesterday where he had this desir exchanged. He is frustrated his foleys keep getting clogged.  He denies fever chills sob cp abd pain n/v.     PAST MEDICAL & SURGICAL HISTORY:  HTN (hypertension)  Atrial fibrillation    Home Medications:  acetaminophen 325 mg oral tablet: 2 tab(s) orally every 6 hours, As needed, Temp greater or equal to 38C (100.4F), Mild Pain (1 - 3) (2023 15:07)  metoprolol tartrate 100 mg oral tablet: 1 tab(s) orally 2 times a day (2023 19:56)  valsartan 320 mg oral tablet: 1 tab(s) orally once a day (2023 19:56)    Review of Systems: All negative except where noted in HPI    MEDICATIONS  (STANDING):  ciprofloxacin     Tablet 500 milliGRAM(s) Oral once    Vital Signs Last 24 Hrs  T(C): 36.9 (17 Mar 2023 20:50), Max: 36.9 (17 Mar 2023 20:50)  T(F): 98.4 (17 Mar 2023 20:50), Max: 98.4 (17 Mar 2023 20:50)  HR: 61 (17 Mar 2023 20:50) (61 - 61)  BP: 159/90 (17 Mar 2023 20:50) (159/90 - 159/90)  BP(mean): --  RR: 18 (17 Mar 2023 20:50) (18 - 18)  SpO2: 99% (17 Mar 2023 20:50) (99% - 99%)  Parameters below as of 17 Mar 2023 20:50  Patient On (Oxygen Delivery Method): room air    Physical Exam:  General: NAD  HEENT: PERRL, EOMI  Neck: No JVD, FROM without pain  Pulm: Respirations non labored, no accessory muscle use  Abdomen: Soft, NT/ND, without rebound or guarding  Neuro: Alert and oriented x3, no focal deficits  : 18F desir in, draining clear yellow urine     LABS:                    13.2   12.50 )-----------( 338      ( 17 Mar 2023 23:10 )             38.7     137  |  100  |  14.5  ----------------------------<  114<H>  3.7   |  24.0  |  0.53  Ca    9.1      17 Mar 2023 23:10  Mg     2.1       TPro  6.6  /  Alb  3.8  /  TBili  0.3<L>  /  DBili  x   /  AST  19  /  ALT  18  /  AlkPhos  75    Urinalysis Basic - ( 17 Mar 2023 23:10 )  Color: Yellow / Appearance: very cloudy / S.020 / pH: x  Gluc: x / Ketone: Negative  / Bili: Negative / Urobili: Negative   Blood: x / Protein: 100 / Nitrite: Positive   Leuk Esterase: Moderate / RBC: >50 /HPF / WBC 11-25 /HPF   Sq Epi: x / Non Sq Epi: Moderate / Bacteria: Few    A: Patient is a 68 yo M who came in c/o his desir not draining, he is a pt of Dr. Jefferson. The ED replaced the desir and it was now draining clear yellow urine.     Plan:   - Keep desir, it is draining clear yellow urine at this time. Follow up with Dr. Anne in his office this week

## 2023-03-18 NOTE — ED ADULT NURSE NOTE - OBJECTIVE STATEMENT
PT A&OX4. PT stated that his f/c was changed at urologist office earlier today.  PT stated that he has been unable to pass any urine through the tube.  PT stated that he tried to irrigate f/c with no success. Pts f/c changed in intake by MD to 20 fr. Pt draining urine at this time. RN changed pt to leg bag, education provided.

## 2023-03-19 ENCOUNTER — NON-APPOINTMENT (OUTPATIENT)
Age: 68
End: 2023-03-19

## 2023-03-19 LAB
PSA FREE FLD-MCNC: 22 %
PSA FREE SERPL-MCNC: 2.75 NG/ML
PSA SERPL-MCNC: 12.4 NG/ML

## 2023-03-29 ENCOUNTER — EMERGENCY (EMERGENCY)
Facility: HOSPITAL | Age: 68
LOS: 1 days | End: 2023-03-29
Attending: STUDENT IN AN ORGANIZED HEALTH CARE EDUCATION/TRAINING PROGRAM
Payer: COMMERCIAL

## 2023-03-29 VITALS
WEIGHT: 160.06 LBS | RESPIRATION RATE: 18 BRPM | SYSTOLIC BLOOD PRESSURE: 167 MMHG | TEMPERATURE: 98 F | HEART RATE: 69 BPM | DIASTOLIC BLOOD PRESSURE: 92 MMHG | OXYGEN SATURATION: 100 % | HEIGHT: 68 IN

## 2023-03-29 VITALS
OXYGEN SATURATION: 97 % | SYSTOLIC BLOOD PRESSURE: 137 MMHG | DIASTOLIC BLOOD PRESSURE: 83 MMHG | HEART RATE: 69 BPM | RESPIRATION RATE: 18 BRPM | TEMPERATURE: 98 F

## 2023-03-29 LAB
APPEARANCE UR: SIGNIFICANT CHANGE UP
BACTERIA # UR AUTO: ABNORMAL
BILIRUB UR-MCNC: NEGATIVE — SIGNIFICANT CHANGE UP
COLOR SPEC: ABNORMAL
DIFF PNL FLD: ABNORMAL
EPI CELLS # UR: SIGNIFICANT CHANGE UP
GLUCOSE UR QL: 100 MG/DL
KETONES UR-MCNC: NEGATIVE — SIGNIFICANT CHANGE UP
LEUKOCYTE ESTERASE UR-ACNC: NEGATIVE — SIGNIFICANT CHANGE UP
NITRITE UR-MCNC: NEGATIVE — SIGNIFICANT CHANGE UP
PH UR: 7 — SIGNIFICANT CHANGE UP (ref 5–8)
PROT UR-MCNC: 100
RBC CASTS # UR COMP ASSIST: >50 /HPF (ref 0–4)
SP GR SPEC: 1.03 — HIGH (ref 1.01–1.02)
UROBILINOGEN FLD QL: NEGATIVE — SIGNIFICANT CHANGE UP
WBC UR QL: SIGNIFICANT CHANGE UP /HPF (ref 0–5)

## 2023-03-29 PROCEDURE — 99284 EMERGENCY DEPT VISIT MOD MDM: CPT | Mod: GC

## 2023-03-29 PROCEDURE — 81001 URINALYSIS AUTO W/SCOPE: CPT

## 2023-03-29 PROCEDURE — 87086 URINE CULTURE/COLONY COUNT: CPT

## 2023-03-29 PROCEDURE — 99283 EMERGENCY DEPT VISIT LOW MDM: CPT

## 2023-03-29 RX ORDER — SOLIFENACIN SUCCINATE 10 MG/1
10 TABLET ORAL DAILY
Qty: 30 | Refills: 3 | Status: ACTIVE | COMMUNITY
Start: 2023-03-29 | End: 1900-01-01

## 2023-03-29 NOTE — ED ADULT TRIAGE NOTE - WEIGHT IN KG
72.6
pt received at intake rm 5 AAO x 3. pt [place] speaking and able to communicate with patient. pt reports going to UC for urine frequency and advised to come to ED for IV abx. pt states he is only able to urinate small drops. pt c/o of chills and pain on urination. pt denies sob, chest pain, n/v/d, fevers, cough, abdominal pain. respirations even and unlabored. 20g iv placed right ac. labs drawn and sent.

## 2023-03-29 NOTE — ED ADULT NURSE REASSESSMENT NOTE - NS ED NURSE REASSESS COMMENT FT1
desir was flushed without resistance. 1 small clot present but now with clear urine draining into desir bag. MD aware
pt c/o 1 small clot in desir bag, initially refusing dc, MD Jordan at bedside. pt desir flushed without resistance, no clots noted, clear urine draining. pt ok for dc. verbalized understanding. will f/u with urologist outpt

## 2023-03-29 NOTE — ED PROVIDER NOTE - NS ED ROS FT
General: Denies fever, chills  HEENT: Denies sore throat  Neck: Denies neck pain  Resp: Denies coughing, SOB  Cardiovascular: Denies CP, palpitations, LE edema  GI: Denies nausea, vomiting, abdominal pain, diarrhea, constipation, blood in stool  : Hematuria. Denies dysuria, frequency, incontinence  MSK: Denies back pain  Neuro: Denies HA, dizziness, numbness, weakness  Skin: Denies rashes.

## 2023-03-29 NOTE — ED PROVIDER NOTE - NSFOLLOWUPINSTRUCTIONS_ED_ALL_ED_FT
You were seen in the emergency room for blood in your Cooper bag. Your Cooper was flushed to ensure it was not acutely blocked. Follow up with Dr Anne as scheduled.     SEEK IMMEDIATE MEDICAL CARE IF YOU HAVE ANY OF THE FOLLOWING SYMPTOMS: severe back or abdominal pain, fever, inability to keep fluids or medicine down, dizziness/lightheadedness, or a change in mental status.

## 2023-03-29 NOTE — ED PROVIDER NOTE - PATIENT PORTAL LINK FT
You can access the FollowMyHealth Patient Portal offered by [facility] by registering at the following website: [email or web address]. By joining [facility]’s FollowMyHealth portal, you will also be able to view your health information using other applications (apps) compatible with our system.

## 2023-03-29 NOTE — ED PROVIDER NOTE - CLINICAL SUMMARY MEDICAL DECISION MAKING FREE TEXT BOX
67M hx BPH presents with hematuria which began earlier tonight. Exam unremarkable. No sign of suprapubic fullness concerning for acute retention. Will flush Cooper to ensure pt not acutely in retention. Recently finished course for UTI, likely chronically colonized as well, low utility in repeating urinalysis

## 2023-03-29 NOTE — ED PROVIDER NOTE - OBJECTIVE STATEMENT
67M hx BPH with chronic desir presents following an episode of hematuria which began earlier tonight. Pt and family at bedside state he has had chronic hematuria and sometimes creates clots which cause urinary retention. Presently pt feels well, no abd pain, back pain, N/V, fever, chills. Recently finished course of Cipro for UTI. Scheduled for prostatectomy next month with Dr Anne

## 2023-03-29 NOTE — ED PROVIDER NOTE - CARE PROVIDER_API CALL
Martin Anne)  Urology  [address], [address]  [place] [address]  Phone: [phone]  Fax: [phone]  Follow Up Time:

## 2023-03-29 NOTE — ED ADULT TRIAGE NOTE - CHIEF COMPLAINT QUOTE
Ambulatory reporting hematuria since last night, chronic desir r/t enlarged prostate with surgery schedule for end of April. Desir last changed and irrigated 3/17 @[facility]. Denies anticoagulation.

## 2023-03-29 NOTE — ED PROVIDER NOTE - ATTENDING CONTRIBUTION TO CARE
67y M w/ hx BPH, chronically Cooper dependent, presents for hematuria. Pt reports noting hematuria with blood clots in leg bag tonight. Last had Cooper changed 2 weeks ago in this ED; completed course of cipro for UTI. No fever, vomiting, flank pain. Is scheduled for prostatectomy next month with his urologist Dr. Anne. On exam, pt well appearing and in no distress. Abdomen soft and nontender, no CVAT. +Hematuria with few blood clots in leg bag. Cooper flushed by RN without difficulty. Pt expresses concern that he may have UTI, as his hematuria resolved after taking antibiotics 2 weeks ago. UA showing hematuria with only 0-2 WBC; will defer treatment at this time. Culture sent. Pt advised of need for f/u with his urologist.

## 2023-03-29 NOTE — ED ADULT NURSE NOTE - OBJECTIVE STATEMENT
pt is a 68 y/o/m w/pmhx BPH w/chronic desir presents with wife c/o hematuria since 7pm tonight with large clots in desir bag. pt concerned with urinary retention. pt with 150cc red urine in desir bag, drained. pt denies abd pain, back pain, fevers, N/V, Recently finished course of abx for UTI. pt has appt with urologist in april for prostates removal

## 2023-03-29 NOTE — ED PROVIDER NOTE - PHYSICAL EXAMINATION
General: Awake, alert, lying in bed in NAD  HEENT: EOMI. No scleral icterus or conjunctival injection. Moist mucous membranes.   Neck:. Soft and supple. Trachea midline  Cardiac: Extremities warm and well perfused. No LE edema.  Resp: No respiratory distress or accessory muscle use. Speaking in full sentences.   Abd: Soft, non-distended. No overlying skin changes  : External desir bag with multiple clots, initially with gross hematuria, now producing clear yellow urine  Skin: No rashes, abrasions, or lacerations.  Neuro: AO x 4. Moves all extremities symmetrically. Motor strength and sensation grossly intact.  Psych: Appropriate mood and affect

## 2023-03-29 NOTE — ED ADULT NURSE NOTE - CHIEF COMPLAINT QUOTE
Ambulatory reporting hematuria since last night, chronic desir r/t enlarged prostate with surgery schedule for end of April. Desir last changed and irrigated 3/17 @[facility]. Denies anticoagulation.

## 2023-03-30 ENCOUNTER — APPOINTMENT (OUTPATIENT)
Dept: UROLOGY | Facility: CLINIC | Age: 68
End: 2023-03-30
Payer: COMMERCIAL

## 2023-03-30 LAB
CULTURE RESULTS: SIGNIFICANT CHANGE UP
SPECIMEN SOURCE: SIGNIFICANT CHANGE UP

## 2023-03-30 PROCEDURE — A4216: CPT | Mod: NC

## 2023-03-30 PROCEDURE — 51700 IRRIGATION OF BLADDER: CPT

## 2023-03-30 NOTE — HISTORY OF PRESENT ILLNESS
[FreeTextEntry1] : Pt presents to office accompanied by wife. He reports his catheter has been draining very little since after emptying it this morning at 7 am. He did not try to flush it himself due to previous issue he had before when he tried to irrigate. He states he went to [facility] ER yesterday for same issue and they irrigated his catheter.

## 2023-03-30 NOTE — ASSESSMENT
[FreeTextEntry1] : Chronic catheter, clogged catheter\par \par I irrigated desir with 100 ml sterile water with no urine return. I deflated balloon and moved catheter further into urethra with immediate moderate sized clot drained into bag and immediate flow of urine, light hematuria. \par Pt had a #18F catheter in and I changed it to #20. Pt tolerated procedure well.\par Pt is pending surgery with Dr. Anne for LEP 4/27/23, he has appt with new PCP today. PT reports he has not received his appt date with PST yet. I will reach out to Gracy regarding..\par Notify office if any further issues.

## 2023-04-03 ENCOUNTER — APPOINTMENT (OUTPATIENT)
Dept: UROLOGY | Facility: CLINIC | Age: 68
End: 2023-04-03
Payer: COMMERCIAL

## 2023-04-03 ENCOUNTER — APPOINTMENT (OUTPATIENT)
Dept: UROLOGY | Facility: CLINIC | Age: 68
End: 2023-04-03

## 2023-04-03 VITALS — DIASTOLIC BLOOD PRESSURE: 101 MMHG | SYSTOLIC BLOOD PRESSURE: 161 MMHG | HEART RATE: 67 BPM

## 2023-04-03 DIAGNOSIS — R31.0 GROSS HEMATURIA: ICD-10-CM

## 2023-04-03 PROCEDURE — 99443: CPT | Mod: 25

## 2023-04-03 RX ORDER — OXYBUTYNIN CHLORIDE 10 MG/1
10 TABLET, EXTENDED RELEASE ORAL
Qty: 30 | Refills: 2 | Status: DISCONTINUED | COMMUNITY
Start: 2023-01-31 | End: 2023-04-03

## 2023-04-03 RX ORDER — CIPROFLOXACIN HYDROCHLORIDE 500 MG/1
500 TABLET, FILM COATED ORAL
Qty: 14 | Refills: 0 | Status: COMPLETED | COMMUNITY
Start: 2023-03-18

## 2023-04-03 RX ORDER — SULFAMETHOXAZOLE AND TRIMETHOPRIM 800; 160 MG/1; MG/1
800-160 TABLET ORAL
Qty: 20 | Refills: 0 | Status: COMPLETED | COMMUNITY
Start: 2023-01-17

## 2023-04-03 RX ORDER — CEPHALEXIN 500 MG/1
500 CAPSULE ORAL
Qty: 21 | Refills: 0 | Status: COMPLETED | COMMUNITY
Start: 2023-03-01

## 2023-04-03 NOTE — HISTORY OF PRESENT ILLNESS
[Home] : at home, [unfilled] , at the time of the visit. [Medical Office: ([facility])___] : at the medical office located in  [Verbal consent obtained from patient] : the patient, [unfilled] [FreeTextEntry1] : \par doing well\par describes some leakage around the tube\par still some spasms\par has PST scheduled for surgery\par no hematuria\par again discussed the options for SPT\par still on vesicare with minimal improvement\par \par

## 2023-04-03 NOTE — ASSESSMENT
[FreeTextEntry1] : if leakage persists will come to the office\par continue with treatment plan\par PST as scheduled

## 2023-04-04 PROBLEM — R31.0 GROSS HEMATURIA: Status: ACTIVE | Noted: 2023-01-25

## 2023-04-04 NOTE — ASSESSMENT
[FreeTextEntry1] : Chronic desir, bladder spasm\par \par Pt with bladder spasms. I informed pt that the desir can cause irritation to the bladder and cause spasms at which time he may uncontrolled leakage. \par Continue Solifenacin 10 mg as ordered\par I flushed the pt's catheter with 50 ml sterile water, no leakage noted, some hematuria noted after flushing, no clots\par Pt is scheduled for LEP 4/27/23\par RTO as needed\par \par \par 1:45p Pt returned back to the office c/o clots in his drainage bag. \par I irrigated desir 200 cc sterile water with hematuria moderate, no clots\par I informed pt to drink adequate fluids to flush bladder\par I applied a new drainage bag

## 2023-04-12 NOTE — ED ADULT TRIAGE NOTE - GLASGOW COMA SCALE: SCORE, MLM
15 Cantharidin Counseling: Calcipotriene Counseling:  I discussed with the patient the risks of calcipotriene including but not limited to erythema, scaling, itching, and irritation.

## 2023-04-13 ENCOUNTER — OUTPATIENT (OUTPATIENT)
Dept: OUTPATIENT SERVICES | Facility: HOSPITAL | Age: 68
LOS: 1 days | End: 2023-04-13
Payer: COMMERCIAL

## 2023-04-13 VITALS
WEIGHT: 158.95 LBS | OXYGEN SATURATION: 99 % | TEMPERATURE: 97 F | DIASTOLIC BLOOD PRESSURE: 80 MMHG | RESPIRATION RATE: 18 BRPM | SYSTOLIC BLOOD PRESSURE: 130 MMHG | HEIGHT: 68 IN | HEART RATE: 68 BPM

## 2023-04-13 DIAGNOSIS — Z01.818 ENCOUNTER FOR OTHER PREPROCEDURAL EXAMINATION: ICD-10-CM

## 2023-04-13 DIAGNOSIS — I10 ESSENTIAL (PRIMARY) HYPERTENSION: ICD-10-CM

## 2023-04-13 DIAGNOSIS — Z98.890 OTHER SPECIFIED POSTPROCEDURAL STATES: Chronic | ICD-10-CM

## 2023-04-13 DIAGNOSIS — N40.0 BENIGN PROSTATIC HYPERPLASIA WITHOUT LOWER URINARY TRACT SYMPTOMS: ICD-10-CM

## 2023-04-13 DIAGNOSIS — Z29.9 ENCOUNTER FOR PROPHYLACTIC MEASURES, UNSPECIFIED: ICD-10-CM

## 2023-04-13 LAB
A1C WITH ESTIMATED AVERAGE GLUCOSE RESULT: 6 % — HIGH (ref 4–5.6)
ANION GAP SERPL CALC-SCNC: 11 MMOL/L — SIGNIFICANT CHANGE UP (ref 5–17)
APPEARANCE UR: ABNORMAL
APTT BLD: 32.4 SEC — SIGNIFICANT CHANGE UP (ref 27.5–35.5)
BACTERIA # UR AUTO: ABNORMAL
BASOPHILS # BLD AUTO: 0.04 K/UL — SIGNIFICANT CHANGE UP (ref 0–0.2)
BASOPHILS NFR BLD AUTO: 0.5 % — SIGNIFICANT CHANGE UP (ref 0–2)
BILIRUB UR-MCNC: NEGATIVE — SIGNIFICANT CHANGE UP
BUN SERPL-MCNC: 16.5 MG/DL — SIGNIFICANT CHANGE UP (ref 8–20)
CALCIUM SERPL-MCNC: 9.2 MG/DL — SIGNIFICANT CHANGE UP (ref 8.4–10.5)
CHLORIDE SERPL-SCNC: 100 MMOL/L — SIGNIFICANT CHANGE UP (ref 96–108)
CO2 SERPL-SCNC: 26 MMOL/L — SIGNIFICANT CHANGE UP (ref 22–29)
COLOR SPEC: ABNORMAL
CREAT SERPL-MCNC: 0.63 MG/DL — SIGNIFICANT CHANGE UP (ref 0.5–1.3)
DIFF PNL FLD: ABNORMAL
EGFR: 104 ML/MIN/1.73M2 — SIGNIFICANT CHANGE UP
EOSINOPHIL # BLD AUTO: 0.05 K/UL — SIGNIFICANT CHANGE UP (ref 0–0.5)
EOSINOPHIL NFR BLD AUTO: 0.6 % — SIGNIFICANT CHANGE UP (ref 0–6)
EPI CELLS # UR: SIGNIFICANT CHANGE UP
ESTIMATED AVERAGE GLUCOSE: 126 MG/DL — HIGH (ref 68–114)
GLUCOSE SERPL-MCNC: 111 MG/DL — HIGH (ref 70–99)
GLUCOSE UR QL: NEGATIVE — SIGNIFICANT CHANGE UP
HCT VFR BLD CALC: 39.9 % — SIGNIFICANT CHANGE UP (ref 39–50)
HGB BLD-MCNC: 13.4 G/DL — SIGNIFICANT CHANGE UP (ref 13–17)
IMM GRANULOCYTES NFR BLD AUTO: 0.4 % — SIGNIFICANT CHANGE UP (ref 0–0.9)
INR BLD: 1.14 RATIO — SIGNIFICANT CHANGE UP (ref 0.88–1.16)
KETONES UR-MCNC: ABNORMAL
LEUKOCYTE ESTERASE UR-ACNC: ABNORMAL
LYMPHOCYTES # BLD AUTO: 1.16 K/UL — SIGNIFICANT CHANGE UP (ref 1–3.3)
LYMPHOCYTES # BLD AUTO: 14.2 % — SIGNIFICANT CHANGE UP (ref 13–44)
MCHC RBC-ENTMCNC: 29 PG — SIGNIFICANT CHANGE UP (ref 27–34)
MCHC RBC-ENTMCNC: 33.6 GM/DL — SIGNIFICANT CHANGE UP (ref 32–36)
MCV RBC AUTO: 86.4 FL — SIGNIFICANT CHANGE UP (ref 80–100)
MONOCYTES # BLD AUTO: 0.49 K/UL — SIGNIFICANT CHANGE UP (ref 0–0.9)
MONOCYTES NFR BLD AUTO: 6 % — SIGNIFICANT CHANGE UP (ref 2–14)
NEUTROPHILS # BLD AUTO: 6.39 K/UL — SIGNIFICANT CHANGE UP (ref 1.8–7.4)
NEUTROPHILS NFR BLD AUTO: 78.3 % — HIGH (ref 43–77)
NITRITE UR-MCNC: POSITIVE
PH UR: 6 — SIGNIFICANT CHANGE UP (ref 5–8)
PLATELET # BLD AUTO: 389 K/UL — SIGNIFICANT CHANGE UP (ref 150–400)
POTASSIUM SERPL-MCNC: 3.9 MMOL/L — SIGNIFICANT CHANGE UP (ref 3.5–5.3)
POTASSIUM SERPL-SCNC: 3.9 MMOL/L — SIGNIFICANT CHANGE UP (ref 3.5–5.3)
PROT UR-MCNC: 30 MG/DL
PROTHROM AB SERPL-ACNC: 13.3 SEC — SIGNIFICANT CHANGE UP (ref 10.5–13.4)
RBC # BLD: 4.62 M/UL — SIGNIFICANT CHANGE UP (ref 4.2–5.8)
RBC # FLD: 12.2 % — SIGNIFICANT CHANGE UP (ref 10.3–14.5)
RBC CASTS # UR COMP ASSIST: >50 /HPF (ref 0–4)
SODIUM SERPL-SCNC: 137 MMOL/L — SIGNIFICANT CHANGE UP (ref 135–145)
SP GR SPEC: 1.01 — SIGNIFICANT CHANGE UP (ref 1.01–1.02)
UROBILINOGEN FLD QL: NEGATIVE — SIGNIFICANT CHANGE UP
WBC # BLD: 8.16 K/UL — SIGNIFICANT CHANGE UP (ref 3.8–10.5)
WBC # FLD AUTO: 8.16 K/UL — SIGNIFICANT CHANGE UP (ref 3.8–10.5)
WBC UR QL: ABNORMAL /HPF (ref 0–5)

## 2023-04-13 PROCEDURE — 93010 ELECTROCARDIOGRAM REPORT: CPT

## 2023-04-13 PROCEDURE — 93005 ELECTROCARDIOGRAM TRACING: CPT

## 2023-04-13 PROCEDURE — G0463: CPT

## 2023-04-13 RX ORDER — SODIUM CHLORIDE 9 MG/ML
3 INJECTION INTRAMUSCULAR; INTRAVENOUS; SUBCUTANEOUS ONCE
Refills: 0 | Status: DISCONTINUED | OUTPATIENT
Start: 2023-04-27 | End: 2023-04-27

## 2023-04-13 RX ORDER — CIPROFLOXACIN LACTATE 400MG/40ML
400 VIAL (ML) INTRAVENOUS ONCE
Refills: 0 | Status: DISCONTINUED | OUTPATIENT
Start: 2023-04-27 | End: 2023-04-28

## 2023-04-13 RX ORDER — ACETAMINOPHEN 500 MG
975 TABLET ORAL ONCE
Refills: 0 | Status: COMPLETED | OUTPATIENT
Start: 2023-04-27 | End: 2023-04-27

## 2023-04-13 NOTE — H&P PST ADULT - ASSESSMENT
67 yr old male presents with history of BPH and hematuria since 2023. Pt went to ER a few times since 2023 with hematuria  and difficulty urinating  Cooper catheter has been in place since  draining hematuria, changed in office a few times last changed a few weeks ago. . pt went to ER on 3/29/23 with c/o increased hematuria , Surgery is scheduled for  23 for laser enucleation of prostate with DR. Anne. medical clearance to be obtained, pt states he is going for covid swab was told he may stay over post op.   OPIOID RISK TOOL    PHYLLIS EACH BOX THAT APPLIES AND ADD TOTALS AT THE END    FAMILY HISTORY OF SUBSTANCE ABUSE                 FEMALE         MALE                                                Alcohol                             [  ]1 pt          [  ]3pts                                               Illegal Durgs                     [  ]2 pts        [  ]3pts                                               Rx Drugs                           [  ]4 pts        [  ]4 pts    PERSONAL HISTORY OF SUBSTANCE ABUSE                                                                                          Alcohol                             [  ]3 pts       [  ]3 pts                                               Illegal Durgs                     [  ]4 pts        [  ]4 pts                                               Rx Drugs                           [  ]5 pts        [  ]5 pts    AGE BETWEEN 16-45 YEARS                                      [  ]1 pt         [  ]1 pt    HISTORY OF PREADOLESCENT   SEXUAL ABUSE                                                             [  ]3 pts        [  ]0pts    PSYCHOLOGICAL DISEASE                     ADD, OCD, Bipolar, Schizophrenia        [  ]2 pts         [  ]2 pts                      Depression                                               [  ]1 pt           [  ]1 pt           SCORING TOTAL   (add numbers and type here)              (*0**)                                     A score of 3 or lower indicated LOW risk for future opiod abuse  A score of 4 to 7 indicated moderate risk for future opiod abuse  A score of 8 or higher indicates a high risk for opiod abuse  CAPRINI VTE 2.0 SCORE [CLOT updated 2019]    AGE RELATED RISK FACTORS                                                       MOBILITY RELATED FACTORS  [ ] Age 41-60 years                                            (1 Point)                    [ ] Bed rest                                                        (1 Point)  [X ] Age: 61-74 years                                           (2 Points)                  [ ] Plaster cast                                                   (2 Points)  [ ] Age= 75 years                                              (3 Points)                    [ ] Bed bound for more than 72 hours                 (2 Points)    DISEASE RELATED RISK FACTORS                                               GENDER SPECIFIC FACTORS  [ ] Edema in the lower extremities                       (1 Point)              [ ] Pregnancy                                                     (1 Point)  [ ] Varicose veins                                               (1 Point)                     [ ] Post-partum < 6 weeks                                   (1 Point)             [ ] BMI > 25 Kg/m2                                            (1 Point)                     [ ] Hormonal therapy  or oral contraception          (1 Point)                 [ ] Sepsis (in the previous month)                        (1 Point)               [ ] History of pregnancy complications                 (1 point)  [ ] Pneumonia or serious lung disease                                               [ ] Unexplained or recurrent                     (1 Point)           (in the previous month)                               (1 Point)  [ ] Abnormal pulmonary function test                     (1 Point)                 SURGERY RELATED RISK FACTORS  [ ] Acute myocardial infarction                              (1 Point)               [ ]  Section                                             (1 Point)  [ ] Congestive heart failure (in the previous month)  (1 Point)      [ ] Minor surgery                                                  (1 Point)   [ ] Inflammatory bowel disease                             (1 Point)               [ ] Arthroscopic surgery                                        (2 Points)  [ ] Central venous access                                      (2 Points)                [X ] General surgery lasting more than 45 minutes (2 points)  [ ] Malignancy- Present or previous                   (2 Points)                [ ] Elective arthroplasty                                         (5 points)    [ ] Stroke (in the previous month)                          (5 Points)                                                                                                                                                           HEMATOLOGY RELATED FACTORS                                                 TRAUMA RELATED RISK FACTORS  [ ] Prior episodes of VTE                                     (3 Points)                [ ] Fracture of the hip, pelvis, or leg                       (5 Points)  [ ] Positive family history for VTE                         (3 Points)             [ ] Acute spinal cord injury (in the previous month)  (5 Points)  [ ] Prothrombin 99437 A                                     (3 Points)               [ ] Paralysis  (less than 1 month)                             (5 Points)  [ ] Factor V Leiden                                             (3 Points)                  [ ] Multiple Trauma within 1 month                        (5 Points)  [ ] Lupus anticoagulants                                     (3 Points)                                                           [ ] Anticardiolipin antibodies                               (3 Points)                                                       [ ] High homocysteine in the blood                      (3 Points)                                             [ ] Other congenital or acquired thrombophilia      (3 Points)                                                [ ] Heparin induced thrombocytopenia                  (3 Points)                                     Total Score [    4      ]

## 2023-04-13 NOTE — H&P PST ADULT - GENITOURINARY COMMENTS
desir in place  since Jan 19  changed last week ( changed several times ) desir to leg bag , hematuria noted

## 2023-04-13 NOTE — H&P PST ADULT - NSANTHOSAYNRD_GEN_A_CORE
No. DELPHINE screening performed.  STOP BANG Legend: 0-2 = LOW Risk; 3-4 = INTERMEDIATE Risk; 5-8 = HIGH Risk
none

## 2023-04-13 NOTE — H&P PST ADULT - NSANTHNECKRD_ENT_A_CORE
Parminder Rocha, [address]   HISTORY AND PHYSICAL       Name:  Magdalena Doherty   MR#:  [id number]   :  1955   Account #:  [de-identified]        Date of Adm:  2017          MD LEWIS Da Silva 23 / PLM   D:  2017   22:30   T:  2017   23:07   Job #:  [id number] No

## 2023-04-13 NOTE — H&P PST ADULT - HISTORY OF PRESENT ILLNESS
67 yr old male presents with history of htn, atrial fibrillation,  BPH and hematuria since JAn 2023. Pt went to ER a few times since JAn 2023 with hematuria  and difficulty urinating  Cooper catheter has been in place since JAn draining hematuria, changed in office a few times last changed a few weeks ago. . pt went to ER on 3/29/23 with c/o increased hematuria , Surgery is scheduled for  4/27/23 for laser enucleation of prostate with DR. Anne.

## 2023-04-16 LAB
-  AMIKACIN: SIGNIFICANT CHANGE UP
-  AMOXICILLIN/CLAVULANIC ACID: SIGNIFICANT CHANGE UP
-  AMPICILLIN/SULBACTAM: SIGNIFICANT CHANGE UP
-  AMPICILLIN: SIGNIFICANT CHANGE UP
-  AZTREONAM: SIGNIFICANT CHANGE UP
-  CEFAZOLIN: SIGNIFICANT CHANGE UP
-  CEFEPIME: SIGNIFICANT CHANGE UP
-  CEFOXITIN: SIGNIFICANT CHANGE UP
-  CEFTRIAXONE: SIGNIFICANT CHANGE UP
-  CIPROFLOXACIN: SIGNIFICANT CHANGE UP
-  ERTAPENEM: SIGNIFICANT CHANGE UP
-  GENTAMICIN: SIGNIFICANT CHANGE UP
-  LEVOFLOXACIN: SIGNIFICANT CHANGE UP
-  MEROPENEM: SIGNIFICANT CHANGE UP
-  NITROFURANTOIN: SIGNIFICANT CHANGE UP
-  PIPERACILLIN/TAZOBACTAM: SIGNIFICANT CHANGE UP
-  TOBRAMYCIN: SIGNIFICANT CHANGE UP
-  TRIMETHOPRIM/SULFAMETHOXAZOLE: SIGNIFICANT CHANGE UP
METHOD TYPE: SIGNIFICANT CHANGE UP

## 2023-04-18 LAB
-  AMPICILLIN: SIGNIFICANT CHANGE UP
-  CIPROFLOXACIN: SIGNIFICANT CHANGE UP
-  DAPTOMYCIN: SIGNIFICANT CHANGE UP
-  LEVOFLOXACIN: SIGNIFICANT CHANGE UP
-  LINEZOLID: SIGNIFICANT CHANGE UP
-  NITROFURANTOIN: SIGNIFICANT CHANGE UP
-  TETRACYCLINE: SIGNIFICANT CHANGE UP
-  VANCOMYCIN: SIGNIFICANT CHANGE UP
CULTURE RESULTS: SIGNIFICANT CHANGE UP
METHOD TYPE: SIGNIFICANT CHANGE UP
ORGANISM # SPEC MICROSCOPIC CNT: SIGNIFICANT CHANGE UP
SPECIMEN SOURCE: SIGNIFICANT CHANGE UP

## 2023-04-19 RX ORDER — CEFDINIR 300 MG/1
300 CAPSULE ORAL
Qty: 14 | Refills: 0 | Status: ACTIVE | COMMUNITY
Start: 2023-04-19 | End: 1900-01-01

## 2023-04-19 RX ORDER — SULFAMETHOXAZOLE AND TRIMETHOPRIM 800; 160 MG/1; MG/1
800-160 TABLET ORAL TWICE DAILY
Qty: 14 | Refills: 0 | Status: DISCONTINUED | COMMUNITY
Start: 2023-04-17 | End: 2023-04-19

## 2023-04-20 RX ORDER — FLUCONAZOLE 100 MG/1
100 TABLET ORAL DAILY
Qty: 7 | Refills: 0 | Status: ACTIVE | COMMUNITY
Start: 2023-04-20 | End: 1900-01-01

## 2023-04-20 RX ORDER — LEVOFLOXACIN 500 MG/1
500 TABLET, FILM COATED ORAL DAILY
Qty: 10 | Refills: 0 | Status: ACTIVE | COMMUNITY
Start: 2023-04-20 | End: 1900-01-01

## 2023-04-26 ENCOUNTER — TRANSCRIPTION ENCOUNTER (OUTPATIENT)
Age: 68
End: 2023-04-26

## 2023-04-27 ENCOUNTER — APPOINTMENT (OUTPATIENT)
Dept: UROLOGY | Facility: HOSPITAL | Age: 68
End: 2023-04-27

## 2023-04-27 ENCOUNTER — RESULT REVIEW (OUTPATIENT)
Age: 68
End: 2023-04-27

## 2023-04-27 ENCOUNTER — INPATIENT (INPATIENT)
Facility: HOSPITAL | Age: 68
LOS: 0 days | Discharge: HOME | DRG: 713 | End: 2023-04-28
Attending: STUDENT IN AN ORGANIZED HEALTH CARE EDUCATION/TRAINING PROGRAM | Admitting: STUDENT IN AN ORGANIZED HEALTH CARE EDUCATION/TRAINING PROGRAM
Payer: COMMERCIAL

## 2023-04-27 VITALS
HEART RATE: 66 BPM | SYSTOLIC BLOOD PRESSURE: 167 MMHG | HEIGHT: 68 IN | WEIGHT: 158.95 LBS | TEMPERATURE: 99 F | DIASTOLIC BLOOD PRESSURE: 91 MMHG | RESPIRATION RATE: 16 BRPM

## 2023-04-27 DIAGNOSIS — Z98.890 OTHER SPECIFIED POSTPROCEDURAL STATES: Chronic | ICD-10-CM

## 2023-04-27 DIAGNOSIS — N40.0 BENIGN PROSTATIC HYPERPLASIA WITHOUT LOWER URINARY TRACT SYMPTOMS: ICD-10-CM

## 2023-04-27 LAB
ANION GAP SERPL CALC-SCNC: 15 MMOL/L — SIGNIFICANT CHANGE UP (ref 5–17)
BASOPHILS # BLD AUTO: 0.03 K/UL — SIGNIFICANT CHANGE UP (ref 0–0.2)
BASOPHILS NFR BLD AUTO: 0.2 % — SIGNIFICANT CHANGE UP (ref 0–2)
BUN SERPL-MCNC: 15.4 MG/DL — SIGNIFICANT CHANGE UP (ref 8–20)
CALCIUM SERPL-MCNC: 8.7 MG/DL — SIGNIFICANT CHANGE UP (ref 8.4–10.5)
CHLORIDE SERPL-SCNC: 100 MMOL/L — SIGNIFICANT CHANGE UP (ref 96–108)
CO2 SERPL-SCNC: 23 MMOL/L — SIGNIFICANT CHANGE UP (ref 22–29)
CREAT SERPL-MCNC: 0.62 MG/DL — SIGNIFICANT CHANGE UP (ref 0.5–1.3)
EGFR: 105 ML/MIN/1.73M2 — SIGNIFICANT CHANGE UP
EOSINOPHIL # BLD AUTO: 0.01 K/UL — SIGNIFICANT CHANGE UP (ref 0–0.5)
EOSINOPHIL NFR BLD AUTO: 0.1 % — SIGNIFICANT CHANGE UP (ref 0–6)
GAS PNL BLDA: SIGNIFICANT CHANGE UP
GLUCOSE SERPL-MCNC: 189 MG/DL — HIGH (ref 70–99)
HCT VFR BLD CALC: 38.8 % — LOW (ref 39–50)
HGB BLD-MCNC: 12.9 G/DL — LOW (ref 13–17)
IMM GRANULOCYTES NFR BLD AUTO: 0.5 % — SIGNIFICANT CHANGE UP (ref 0–0.9)
LYMPHOCYTES # BLD AUTO: 0.39 K/UL — LOW (ref 1–3.3)
LYMPHOCYTES # BLD AUTO: 2.3 % — LOW (ref 13–44)
MCHC RBC-ENTMCNC: 28.7 PG — SIGNIFICANT CHANGE UP (ref 27–34)
MCHC RBC-ENTMCNC: 33.2 GM/DL — SIGNIFICANT CHANGE UP (ref 32–36)
MCV RBC AUTO: 86.2 FL — SIGNIFICANT CHANGE UP (ref 80–100)
MONOCYTES # BLD AUTO: 0.28 K/UL — SIGNIFICANT CHANGE UP (ref 0–0.9)
MONOCYTES NFR BLD AUTO: 1.6 % — LOW (ref 2–14)
NEUTROPHILS # BLD AUTO: 16.27 K/UL — HIGH (ref 1.8–7.4)
NEUTROPHILS NFR BLD AUTO: 95.3 % — HIGH (ref 43–77)
PLATELET # BLD AUTO: 243 K/UL — SIGNIFICANT CHANGE UP (ref 150–400)
POTASSIUM SERPL-MCNC: 3.3 MMOL/L — LOW (ref 3.5–5.3)
POTASSIUM SERPL-SCNC: 3.3 MMOL/L — LOW (ref 3.5–5.3)
RBC # BLD: 4.5 M/UL — SIGNIFICANT CHANGE UP (ref 4.2–5.8)
RBC # FLD: 12.7 % — SIGNIFICANT CHANGE UP (ref 10.3–14.5)
SODIUM SERPL-SCNC: 138 MMOL/L — SIGNIFICANT CHANGE UP (ref 135–145)
WBC # BLD: 17.07 K/UL — HIGH (ref 3.8–10.5)
WBC # FLD AUTO: 17.07 K/UL — HIGH (ref 3.8–10.5)

## 2023-04-27 PROCEDURE — 88305 TISSUE EXAM BY PATHOLOGIST: CPT | Mod: 26

## 2023-04-27 DEVICE — LASER FIBER SOLTIVE 550: Type: IMPLANTABLE DEVICE | Status: FUNCTIONAL

## 2023-04-27 DEVICE — MOCELLATOR ROTATION SINGLEUSE 4.75: Type: IMPLANTABLE DEVICE | Status: FUNCTIONAL

## 2023-04-27 RX ORDER — FLUCONAZOLE 150 MG/1
200 TABLET ORAL DAILY
Refills: 0 | Status: DISCONTINUED | OUTPATIENT
Start: 2023-04-28 | End: 2023-04-28

## 2023-04-27 RX ORDER — FLUCONAZOLE 150 MG/1
100 TABLET ORAL EVERY 24 HOURS
Refills: 0 | Status: CANCELLED | OUTPATIENT
Start: 2023-04-28 | End: 2023-04-27

## 2023-04-27 RX ORDER — METOPROLOL TARTRATE 50 MG
100 TABLET ORAL
Refills: 0 | Status: DISCONTINUED | OUTPATIENT
Start: 2023-04-27 | End: 2023-04-28

## 2023-04-27 RX ORDER — HYDROMORPHONE HYDROCHLORIDE 2 MG/ML
0.5 INJECTION INTRAMUSCULAR; INTRAVENOUS; SUBCUTANEOUS
Refills: 0 | Status: DISCONTINUED | OUTPATIENT
Start: 2023-04-27 | End: 2023-04-27

## 2023-04-27 RX ORDER — FLUCONAZOLE 150 MG/1
100 TABLET ORAL ONCE
Refills: 0 | Status: DISCONTINUED | OUTPATIENT
Start: 2023-04-27 | End: 2023-04-27

## 2023-04-27 RX ORDER — FUROSEMIDE 40 MG
10 TABLET ORAL DAILY
Refills: 0 | Status: COMPLETED | OUTPATIENT
Start: 2023-04-28 | End: 2023-04-28

## 2023-04-27 RX ORDER — ONDANSETRON 8 MG/1
4 TABLET, FILM COATED ORAL ONCE
Refills: 0 | Status: COMPLETED | OUTPATIENT
Start: 2023-04-27 | End: 2023-04-27

## 2023-04-27 RX ORDER — ATROPA BELLADONNA AND OPIUM 16.2; 6 MG/1; MG/1
1 SUPPOSITORY RECTAL
Refills: 0 | Status: DISCONTINUED | OUTPATIENT
Start: 2023-04-27 | End: 2023-04-28

## 2023-04-27 RX ORDER — CIPROFLOXACIN LACTATE 400MG/40ML
400 VIAL (ML) INTRAVENOUS EVERY 12 HOURS
Refills: 0 | Status: DISCONTINUED | OUTPATIENT
Start: 2023-04-27 | End: 2023-04-28

## 2023-04-27 RX ORDER — VALSARTAN 80 MG/1
320 TABLET ORAL DAILY
Refills: 0 | Status: DISCONTINUED | OUTPATIENT
Start: 2023-04-27 | End: 2023-04-28

## 2023-04-27 RX ORDER — HEPARIN SODIUM 5000 [USP'U]/ML
5000 INJECTION INTRAVENOUS; SUBCUTANEOUS EVERY 8 HOURS
Refills: 0 | Status: DISCONTINUED | OUTPATIENT
Start: 2023-04-28 | End: 2023-04-28

## 2023-04-27 RX ORDER — SODIUM CHLORIDE 9 MG/ML
500 INJECTION, SOLUTION INTRAVENOUS ONCE
Refills: 0 | Status: COMPLETED | OUTPATIENT
Start: 2023-04-27 | End: 2023-04-27

## 2023-04-27 RX ORDER — SENNA PLUS 8.6 MG/1
1 TABLET ORAL AT BEDTIME
Refills: 0 | Status: DISCONTINUED | OUTPATIENT
Start: 2023-04-27 | End: 2023-04-28

## 2023-04-27 RX ORDER — HYDROMORPHONE HYDROCHLORIDE 2 MG/ML
1 INJECTION INTRAMUSCULAR; INTRAVENOUS; SUBCUTANEOUS
Refills: 0 | Status: DISCONTINUED | OUTPATIENT
Start: 2023-04-27 | End: 2023-04-27

## 2023-04-27 RX ORDER — FLUCONAZOLE 150 MG/1
TABLET ORAL
Refills: 0 | Status: DISCONTINUED | OUTPATIENT
Start: 2023-04-27 | End: 2023-04-27

## 2023-04-27 RX ORDER — ONDANSETRON 8 MG/1
4 TABLET, FILM COATED ORAL EVERY 6 HOURS
Refills: 0 | Status: DISCONTINUED | OUTPATIENT
Start: 2023-04-27 | End: 2023-04-28

## 2023-04-27 RX ORDER — SODIUM CHLORIDE 9 MG/ML
1000 INJECTION, SOLUTION INTRAVENOUS
Refills: 0 | Status: DISCONTINUED | OUTPATIENT
Start: 2023-04-27 | End: 2023-04-27

## 2023-04-27 RX ORDER — FUROSEMIDE 40 MG
10 TABLET ORAL ONCE
Refills: 0 | Status: COMPLETED | OUTPATIENT
Start: 2023-04-27 | End: 2023-04-27

## 2023-04-27 RX ORDER — SODIUM CHLORIDE 9 MG/ML
1000 INJECTION, SOLUTION INTRAVENOUS
Refills: 0 | Status: DISCONTINUED | OUTPATIENT
Start: 2023-04-27 | End: 2023-04-28

## 2023-04-27 RX ORDER — POTASSIUM CHLORIDE 20 MEQ
10 PACKET (EA) ORAL
Refills: 0 | Status: COMPLETED | OUTPATIENT
Start: 2023-04-27 | End: 2023-04-27

## 2023-04-27 RX ADMIN — ONDANSETRON 4 MILLIGRAM(S): 8 TABLET, FILM COATED ORAL at 13:48

## 2023-04-27 RX ADMIN — ONDANSETRON 4 MILLIGRAM(S): 8 TABLET, FILM COATED ORAL at 16:07

## 2023-04-27 RX ADMIN — SODIUM CHLORIDE 1000 MILLILITER(S): 9 INJECTION, SOLUTION INTRAVENOUS at 14:00

## 2023-04-27 RX ADMIN — Medication 200 MILLIGRAM(S): at 22:33

## 2023-04-27 RX ADMIN — Medication 975 MILLIGRAM(S): at 06:42

## 2023-04-27 RX ADMIN — Medication 10 MILLIGRAM(S): at 13:48

## 2023-04-27 RX ADMIN — Medication 100 MILLIEQUIVALENT(S): at 20:17

## 2023-04-27 RX ADMIN — Medication 100 MILLIEQUIVALENT(S): at 18:36

## 2023-04-27 RX ADMIN — Medication 100 MILLIGRAM(S): at 19:07

## 2023-04-27 RX ADMIN — Medication 100 MILLIEQUIVALENT(S): at 19:07

## 2023-04-27 RX ADMIN — SODIUM CHLORIDE 75 MILLILITER(S): 9 INJECTION, SOLUTION INTRAVENOUS at 22:24

## 2023-04-27 NOTE — CHART NOTE - NSCHARTNOTEFT_GEN_A_CORE
04-27-23 @ 16:47    Pt s/p laser enucleation of very enlarged prostate with morcellation.    Pt resting comfortably, no c/o pain.  CBI running at slow rate, urine clear. Pt w/o nausea or vomiting.    T(C): 36.9 (04-27-23 @ 12:35), Max: 37.1 (04-27-23 @ 06:11)  HR: 84 (04-27-23 @ 15:30) (66 - 84)  BP: 131/78 (04-27-23 @ 15:30) (130/77 - 167/91)  RR: 17 (04-27-23 @ 15:30) (15 - 18)  SpO2: 100% (04-27-23 @ 15:30) (99% - 100%)    04-27-23 @ 07:01  -  04-27-23 @ 16:47  --------------------------------------------------------  IN: 225 mL / OUT: 0 mL / NET: 225 mL        Physical Exam:  A&Ox4  pt in NAD  abdomen: soft , nd/nt  : 3 way desir in place, draining well, CBI running                          12.9   17.07 )-----------( 243      ( 27 Apr 2023 13:30 )             38.8   04-27    138  |  100  |  15.4  ----------------------------<  189<H>  3.3<L>   |  23.0  |  0.62    Ca    8.7      27 Apr 2023 13:30      A/P: 66 yo male s/p laser enucleation of enlarged prostate  - cont CBI at slow drip  - hold CBI at 4am if urine light pink/clear  - hypokalemia-  3 K- riders ordered  - incentive spirometer  - am labs  - am lasix 10mg  - poss TOV in am if urine remains clear

## 2023-04-27 NOTE — BRIEF OPERATIVE NOTE - OPERATION/FINDINGS
very large prostate  3 lobe technique, very vascular, minimal residual prostate tissue, mainly on anterior wall  22 3 way  70ml in balloon

## 2023-04-28 ENCOUNTER — TRANSCRIPTION ENCOUNTER (OUTPATIENT)
Age: 68
End: 2023-04-28

## 2023-04-28 VITALS — DIASTOLIC BLOOD PRESSURE: 96 MMHG | SYSTOLIC BLOOD PRESSURE: 148 MMHG | HEART RATE: 83 BPM

## 2023-04-28 LAB
ANION GAP SERPL CALC-SCNC: 11 MMOL/L — SIGNIFICANT CHANGE UP (ref 5–17)
BASOPHILS # BLD AUTO: 0.02 K/UL — SIGNIFICANT CHANGE UP (ref 0–0.2)
BASOPHILS NFR BLD AUTO: 0.1 % — SIGNIFICANT CHANGE UP (ref 0–2)
BUN SERPL-MCNC: 12.4 MG/DL — SIGNIFICANT CHANGE UP (ref 8–20)
CALCIUM SERPL-MCNC: 9.2 MG/DL — SIGNIFICANT CHANGE UP (ref 8.4–10.5)
CHLORIDE SERPL-SCNC: 101 MMOL/L — SIGNIFICANT CHANGE UP (ref 96–108)
CO2 SERPL-SCNC: 27 MMOL/L — SIGNIFICANT CHANGE UP (ref 22–29)
CREAT SERPL-MCNC: 0.68 MG/DL — SIGNIFICANT CHANGE UP (ref 0.5–1.3)
EGFR: 102 ML/MIN/1.73M2 — SIGNIFICANT CHANGE UP
EOSINOPHIL # BLD AUTO: 0.02 K/UL — SIGNIFICANT CHANGE UP (ref 0–0.5)
EOSINOPHIL NFR BLD AUTO: 0.1 % — SIGNIFICANT CHANGE UP (ref 0–6)
GLUCOSE SERPL-MCNC: 122 MG/DL — HIGH (ref 70–99)
HCT VFR BLD CALC: 37.6 % — LOW (ref 39–50)
HGB BLD-MCNC: 12.6 G/DL — LOW (ref 13–17)
IMM GRANULOCYTES NFR BLD AUTO: 0.4 % — SIGNIFICANT CHANGE UP (ref 0–0.9)
LYMPHOCYTES # BLD AUTO: 1.14 K/UL — SIGNIFICANT CHANGE UP (ref 1–3.3)
LYMPHOCYTES # BLD AUTO: 8.2 % — LOW (ref 13–44)
MCHC RBC-ENTMCNC: 29 PG — SIGNIFICANT CHANGE UP (ref 27–34)
MCHC RBC-ENTMCNC: 33.5 GM/DL — SIGNIFICANT CHANGE UP (ref 32–36)
MCV RBC AUTO: 86.6 FL — SIGNIFICANT CHANGE UP (ref 80–100)
MONOCYTES # BLD AUTO: 1.03 K/UL — HIGH (ref 0–0.9)
MONOCYTES NFR BLD AUTO: 7.4 % — SIGNIFICANT CHANGE UP (ref 2–14)
NEUTROPHILS # BLD AUTO: 11.66 K/UL — HIGH (ref 1.8–7.4)
NEUTROPHILS NFR BLD AUTO: 83.8 % — HIGH (ref 43–77)
PLATELET # BLD AUTO: 250 K/UL — SIGNIFICANT CHANGE UP (ref 150–400)
POTASSIUM SERPL-MCNC: 3.6 MMOL/L — SIGNIFICANT CHANGE UP (ref 3.5–5.3)
POTASSIUM SERPL-SCNC: 3.6 MMOL/L — SIGNIFICANT CHANGE UP (ref 3.5–5.3)
RBC # BLD: 4.34 M/UL — SIGNIFICANT CHANGE UP (ref 4.2–5.8)
RBC # FLD: 12.8 % — SIGNIFICANT CHANGE UP (ref 10.3–14.5)
SODIUM SERPL-SCNC: 139 MMOL/L — SIGNIFICANT CHANGE UP (ref 135–145)
WBC # BLD: 13.93 K/UL — HIGH (ref 3.8–10.5)
WBC # FLD AUTO: 13.93 K/UL — HIGH (ref 3.8–10.5)

## 2023-04-28 PROCEDURE — 84295 ASSAY OF SERUM SODIUM: CPT

## 2023-04-28 PROCEDURE — C1782: CPT

## 2023-04-28 PROCEDURE — 85014 HEMATOCRIT: CPT

## 2023-04-28 PROCEDURE — 82947 ASSAY GLUCOSE BLOOD QUANT: CPT

## 2023-04-28 PROCEDURE — 83605 ASSAY OF LACTIC ACID: CPT

## 2023-04-28 PROCEDURE — 85018 HEMOGLOBIN: CPT

## 2023-04-28 PROCEDURE — 82435 ASSAY OF BLOOD CHLORIDE: CPT

## 2023-04-28 PROCEDURE — 82803 BLOOD GASES ANY COMBINATION: CPT

## 2023-04-28 PROCEDURE — 82330 ASSAY OF CALCIUM: CPT

## 2023-04-28 PROCEDURE — 80048 BASIC METABOLIC PNL TOTAL CA: CPT

## 2023-04-28 PROCEDURE — 36415 COLL VENOUS BLD VENIPUNCTURE: CPT

## 2023-04-28 PROCEDURE — 85025 COMPLETE CBC W/AUTO DIFF WBC: CPT

## 2023-04-28 PROCEDURE — 88305 TISSUE EXAM BY PATHOLOGIST: CPT

## 2023-04-28 PROCEDURE — C9399: CPT

## 2023-04-28 PROCEDURE — 84132 ASSAY OF SERUM POTASSIUM: CPT

## 2023-04-28 PROCEDURE — C1889: CPT

## 2023-04-28 RX ORDER — CIPROFLOXACIN LACTATE 400MG/40ML
1 VIAL (ML) INTRAVENOUS
Qty: 10 | Refills: 0
Start: 2023-04-28 | End: 2023-05-02

## 2023-04-28 RX ORDER — FLUCONAZOLE 150 MG/1
1 TABLET ORAL
Qty: 5 | Refills: 0
Start: 2023-04-28 | End: 2023-05-02

## 2023-04-28 RX ADMIN — FLUCONAZOLE 200 MILLIGRAM(S): 150 TABLET ORAL at 12:43

## 2023-04-28 RX ADMIN — HEPARIN SODIUM 5000 UNIT(S): 5000 INJECTION INTRAVENOUS; SUBCUTANEOUS at 15:50

## 2023-04-28 RX ADMIN — Medication 100 MILLIGRAM(S): at 17:33

## 2023-04-28 RX ADMIN — Medication 100 MILLIGRAM(S): at 06:08

## 2023-04-28 RX ADMIN — Medication 200 MILLIGRAM(S): at 09:31

## 2023-04-28 RX ADMIN — Medication 10 MILLIGRAM(S): at 06:10

## 2023-04-28 RX ADMIN — HEPARIN SODIUM 5000 UNIT(S): 5000 INJECTION INTRAVENOUS; SUBCUTANEOUS at 06:14

## 2023-04-28 RX ADMIN — VALSARTAN 320 MILLIGRAM(S): 80 TABLET ORAL at 06:09

## 2023-04-28 NOTE — DISCHARGE NOTE NURSING/CASE MANAGEMENT/SOCIAL WORK - NSDCPEFALRISK_GEN_ALL_CORE
For information on Fall & Injury Prevention, visit: [email or web address] OR  [email or web address] OR  https://www.cdc.gov/steadi/patient.html

## 2023-04-28 NOTE — PROGRESS NOTE ADULT - SUBJECTIVE AND OBJECTIVE BOX
Urology f/u:  POD# 1 s/p laser enucleation of prostate    Patient seen and examined at bedside, awake, alert resting comfortable.    Tolerated surgery well no complaints this am    Vital Signs Last 24 Hrs  T(C): 36.8 (28 Apr 2023 05:00), Max: 36.9 (27 Apr 2023 12:35)  T(F): 98.3 (28 Apr 2023 05:00), Max: 98.4 (27 Apr 2023 12:35)  HR: 72 (28 Apr 2023 05:00) (69 - 84)  BP: 153/86 (28 Apr 2023 05:00) (130/77 - 153/86)  BP(mean): 96 (27 Apr 2023 13:45) (95 - 96)  RR: 18 (28 Apr 2023 05:00) (15 - 18)  SpO2: 98% (28 Apr 2023 05:00) (97% - 100%)    : desir in place CBI continues slow drip- noted light pink/ clear in tubing, light pink in bag : CBI clamped and discontinued at 6:43 am .       will follow urine during the morning    labs:  pending am results      Impression: stable POD# 1 , tolerated surgery well , CBI slow drip light pink/ clear this am ( discontinued ) improving  discuss with Surgeon present situation and continued care and management  Plan:  CBI clamped this am - continue post op care f/u am labs and follow urine output if continues to be light pink or yellow will go to TOV later this am.      none

## 2023-04-28 NOTE — DISCHARGE NOTE PROVIDER - NSCORESITESY/N_GEN_A_CORE_RD
No
Review of Systems    Constitutional: (-) fever   Eyes/ENT: (-) vision changes  Cardiovascular: (-) chest pain, (-) syncope (-) palpitations  Respiratory: (-) cough, (-) shortness of breath  Gastrointestinal: (-) vomiting, (-) diarrhea (-)black/bloody stools (-) abdominal pain  Genitourinary:  (-) dysuria   Musculoskeletal: (-) neck pain, (+) back pain, (-) leg swelling  Integumentary: (-) rash, (-) edema  Neurological: (-) headache,  (-) confusion  Hematologic: (-) easy bruising

## 2023-04-28 NOTE — DISCHARGE NOTE PROVIDER - NSDCCPCAREPLAN_GEN_ALL_CORE_FT
PRINCIPAL DISCHARGE DIAGNOSIS  Diagnosis: BPH with urinary obstruction  Assessment and Plan of Treatment:

## 2023-04-28 NOTE — DISCHARGE NOTE PROVIDER - HOSPITAL COURSE
Patient presented to [facility] for elective surgery with laser enucleation of prostate .  Patient under went successful surgery and tolerated procedure well. Patient was then admitted with desir catheter  ( 3-way ) in place and placed on continuous irrigation .  Patient with improved labs in am 4/28/23, wbc decreasing and renal function wnl.  Desir catheter noted with light pink urine output, CBI was clamped and discontinued this am .  Patient was followed several hours noted urine remained light pink, thus desir was discontinued and trial of void was in progress.  Patient was OOB and ambulating well, tolerating PO diet and no urinary symptoms at present.  Patient was seen again several hours had voided 300cc of light pink/red urine without difficulty and post void bladder scan was obtained with 65cc in bladder.  Discussed with surgeon present situation and patient was discharged with full follow up instructions verbally told to patient and wife and written instructions given .

## 2023-04-28 NOTE — DISCHARGE NOTE NURSING/CASE MANAGEMENT/SOCIAL WORK - PATIENT PORTAL LINK FT
You can access the FollowMyHealth Patient Portal offered by [facility] by registering at the following website: [email or web address]. By joining [facility]’s FollowMyHealth portal, you will also be able to view your health information using other applications (apps) compatible with our system.

## 2023-04-28 NOTE — DISCHARGE NOTE PROVIDER - NSDCFUADDAPPT_GEN_ALL_CORE_FT
Dr. Jefferson office will contact you in next few days ( most likely early next week ) to schedule follow up appointment in office.   Take antibiotics as instructed for 5 days , can use over the counter pain medication and continue to use the Miralax .

## 2023-04-28 NOTE — DISCHARGE NOTE PROVIDER - CARE PROVIDER_API CALL
Martin Anne)  Urology  [address], [address]  [place] [address]  Phone: [phone]  Fax: [phone]  Follow Up Time: 2 weeks

## 2023-04-28 NOTE — DISCHARGE NOTE PROVIDER - NSDCMRMEDTOKEN_GEN_ALL_CORE_FT
ciprofloxacin 500 mg oral tablet: 1 tab(s) orally every 12 hours MDD: 2 tablets  finasteride 5 mg oral tablet: 1 tab(s) orally once a day  fluconazole 200 mg oral tablet: 1 tab(s) orally once a day MDD: 1 tablet  metoprolol tartrate 100 mg oral tablet: 1 tab(s) orally 2 times a day  tamsulosin 0.4 mg oral capsule: 1 cap(s) orally once a day (at bedtime)  valsartan 320 mg oral tablet: 1 tab(s) orally once a day

## 2023-05-01 PROBLEM — Z87.438 PERSONAL HISTORY OF OTHER DISEASES OF MALE GENITAL ORGANS: Chronic | Status: ACTIVE | Noted: 2023-04-13

## 2023-05-03 LAB — SURGICAL PATHOLOGY STUDY: SIGNIFICANT CHANGE UP

## 2023-05-10 ENCOUNTER — APPOINTMENT (OUTPATIENT)
Dept: UROLOGY | Facility: CLINIC | Age: 68
End: 2023-05-10
Payer: COMMERCIAL

## 2023-05-10 PROCEDURE — 99024 POSTOP FOLLOW-UP VISIT: CPT

## 2023-05-11 NOTE — HISTORY OF PRESENT ILLNESS
[FreeTextEntry1] : 66 yo M for follow up\par \par 4/27/2023 LEP\par pathology benign\par uroflow: only 60 ml, bell shaped\par PVR 98 ml\par voiding well, still some leakage, no hematuria, good stream\par \par plan:\par - kegel\par - uroflow and PSA in 3 months

## 2023-06-26 ENCOUNTER — APPOINTMENT (OUTPATIENT)
Dept: UROLOGY | Facility: CLINIC | Age: 68
End: 2023-06-26
Payer: COMMERCIAL

## 2023-06-26 ENCOUNTER — NON-APPOINTMENT (OUTPATIENT)
Age: 68
End: 2023-06-26

## 2023-06-26 VITALS — HEART RATE: 56 BPM | SYSTOLIC BLOOD PRESSURE: 169 MMHG | DIASTOLIC BLOOD PRESSURE: 92 MMHG

## 2023-06-26 DIAGNOSIS — N39.0 URINARY TRACT INFECTION, SITE NOT SPECIFIED: ICD-10-CM

## 2023-06-26 LAB
BILIRUB UR QL STRIP: NORMAL
CLARITY UR: CLEAR
COLLECTION METHOD: NORMAL
GLUCOSE UR-MCNC: NORMAL
HCG UR QL: 0.2 EU/DL
HGB UR QL STRIP.AUTO: ABNORMAL
KETONES UR-MCNC: NORMAL
LEUKOCYTE ESTERASE UR QL STRIP: ABNORMAL
NITRITE UR QL STRIP: NORMAL
PH UR STRIP: 7
PROT UR STRIP-MCNC: NORMAL
SP GR UR STRIP: 1.01

## 2023-06-26 PROCEDURE — 81003 URINALYSIS AUTO W/O SCOPE: CPT | Mod: QW

## 2023-06-26 PROCEDURE — 99213 OFFICE O/P EST LOW 20 MIN: CPT | Mod: 24,25

## 2023-06-26 RX ORDER — LEVOFLOXACIN 500 MG/1
500 TABLET, FILM COATED ORAL DAILY
Qty: 7 | Refills: 0 | Status: ACTIVE | COMMUNITY
Start: 2023-06-26 | End: 1900-01-01

## 2023-06-26 NOTE — HISTORY OF PRESENT ILLNESS
[FreeTextEntry1] : \par 68 yo\par s/p LEP\par see last visit 05/2023\par planned for follow up with PSA in 08/2023\par \par now mentions cloudy urine, and some white tissue sometimes\par no difficulty voiding, minimal leakage, no hematuria\par \par plan:\par - urine culture\par - abx\par - will update results over the phone\par - keep visit in 08/2023\par - continue with kegel\par

## 2023-06-26 NOTE — ASSESSMENT
[FreeTextEntry1] : \par plan:\par - urine culture\par - abx\par - will update results over the phone\par - keep visit in 08/2023\par - continue with tiffany\par

## 2023-07-05 ENCOUNTER — OFFICE (OUTPATIENT)
Dept: URBAN - METROPOLITAN AREA CLINIC 114 | Facility: CLINIC | Age: 68
Setting detail: OPHTHALMOLOGY
End: 2023-07-05
Payer: COMMERCIAL

## 2023-07-05 DIAGNOSIS — H40.013: ICD-10-CM

## 2023-07-05 DIAGNOSIS — H25.13: ICD-10-CM

## 2023-07-05 PROCEDURE — 92014 COMPRE OPH EXAM EST PT 1/>: CPT | Performed by: SPECIALIST

## 2023-07-05 PROCEDURE — 92133 CPTRZD OPH DX IMG PST SGM ON: CPT | Performed by: SPECIALIST

## 2023-07-05 ASSESSMENT — TONOMETRY
OD_IOP_MMHG: 20
OS_IOP_MMHG: 20

## 2023-07-05 ASSESSMENT — REFRACTION_CURRENTRX
OS_ADD: +1.75
OD_ADD: +1.75
OS_OVR_VA: 20/
OD_SPHERE: +2.00
OD_OVR_VA: 20/
OS_SPHERE: +2.50

## 2023-07-05 ASSESSMENT — VISUAL ACUITY
OD_BCVA: 20/25
OS_BCVA: 20/20

## 2023-07-05 ASSESSMENT — CONFRONTATIONAL VISUAL FIELD TEST (CVF)
OS_FINDINGS: FULL
OD_FINDINGS: FULL

## 2023-07-05 ASSESSMENT — PACHYMETRY
OD_CT_UM: 594
OD_CT_CORRECTION: -4
OS_CT_CORRECTION: -3
OS_CT_UM: 582

## 2023-07-09 LAB — BACTERIA UR CULT: NORMAL

## 2023-07-27 NOTE — BRIEF OPERATIVE NOTE - ANTIBIOTIC PROTOCOL
Spoke w/ patient; two patient identifiers confirmed. Informed that refill for meclizine has been sent to requested pharmacy. Patient verbalized understanding.     Adeline Albert LPN cipro + diflucan

## 2023-08-02 ENCOUNTER — LABORATORY RESULT (OUTPATIENT)
Age: 68
End: 2023-08-02

## 2023-08-11 ENCOUNTER — APPOINTMENT (OUTPATIENT)
Dept: UROLOGY | Facility: CLINIC | Age: 68
End: 2023-08-11
Payer: COMMERCIAL

## 2023-08-11 VITALS — HEART RATE: 69 BPM | SYSTOLIC BLOOD PRESSURE: 181 MMHG | DIASTOLIC BLOOD PRESSURE: 113 MMHG

## 2023-08-11 DIAGNOSIS — N40.0 BENIGN PROSTATIC HYPERPLASIA WITHOUT LOWER URINARY TRACT SYMPMS: ICD-10-CM

## 2023-08-11 PROCEDURE — 99213 OFFICE O/P EST LOW 20 MIN: CPT

## 2023-08-11 NOTE — HISTORY OF PRESENT ILLNESS
[FreeTextEntry1] : 66 yo M for follow up here for follow up after LEP  feeling well no complains no hematuria no leakage good stream  reviewed previous culture 6/2023 - negative reviewed new PSA 8/2/2023 - 0.15 PVR today 3 ml  plan: - follow up in 1 year with new PSA

## 2023-09-16 NOTE — ED PROVIDER NOTE - NS ED ATTENDING STATEMENT MOD
[facility] Physician [facility] [address]  Scheduled Appointment: 11/01/2023    Dayanna Henderson  [facility] [address]  Scheduled Appointment: 11/08/2023     Attending Only

## 2024-01-24 ENCOUNTER — TRANSCRIPTION ENCOUNTER (OUTPATIENT)
Age: 69
End: 2024-01-24

## 2024-01-24 ENCOUNTER — OUTPATIENT (OUTPATIENT)
Dept: OUTPATIENT SERVICES | Facility: HOSPITAL | Age: 69
LOS: 1 days | End: 2024-01-24
Payer: COMMERCIAL

## 2024-01-24 VITALS
SYSTOLIC BLOOD PRESSURE: 122 MMHG | OXYGEN SATURATION: 97 % | DIASTOLIC BLOOD PRESSURE: 68 MMHG | HEART RATE: 99 BPM | RESPIRATION RATE: 18 BRPM

## 2024-01-24 VITALS
OXYGEN SATURATION: 96 % | HEART RATE: 121 BPM | WEIGHT: 160.06 LBS | TEMPERATURE: 98 F | RESPIRATION RATE: 18 BRPM | DIASTOLIC BLOOD PRESSURE: 73 MMHG | SYSTOLIC BLOOD PRESSURE: 123 MMHG | HEIGHT: 68 IN

## 2024-01-24 DIAGNOSIS — Z98.890 OTHER SPECIFIED POSTPROCEDURAL STATES: Chronic | ICD-10-CM

## 2024-01-24 DIAGNOSIS — I48 ATRIAL FIBRILLATION AND FLUTTER: ICD-10-CM

## 2024-01-24 LAB
ANION GAP SERPL CALC-SCNC: 14 MMOL/L — SIGNIFICANT CHANGE UP (ref 5–17)
BUN SERPL-MCNC: 22.3 MG/DL — HIGH (ref 8–20)
CALCIUM SERPL-MCNC: 9.6 MG/DL — SIGNIFICANT CHANGE UP (ref 8.4–10.5)
CHLORIDE SERPL-SCNC: 98 MMOL/L — SIGNIFICANT CHANGE UP (ref 96–108)
CO2 SERPL-SCNC: 24 MMOL/L — SIGNIFICANT CHANGE UP (ref 22–29)
CREAT SERPL-MCNC: 0.99 MG/DL — SIGNIFICANT CHANGE UP (ref 0.5–1.3)
EGFR: 83 ML/MIN/1.73M2 — SIGNIFICANT CHANGE UP
GLUCOSE SERPL-MCNC: 148 MG/DL — HIGH (ref 70–99)
HCT VFR BLD CALC: 44.6 % — SIGNIFICANT CHANGE UP (ref 39–50)
HGB BLD-MCNC: 15.9 G/DL — SIGNIFICANT CHANGE UP (ref 13–17)
MAGNESIUM SERPL-MCNC: 2.1 MG/DL — SIGNIFICANT CHANGE UP (ref 1.6–2.6)
MCHC RBC-ENTMCNC: 31.1 PG — SIGNIFICANT CHANGE UP (ref 27–34)
MCHC RBC-ENTMCNC: 35.7 GM/DL — SIGNIFICANT CHANGE UP (ref 32–36)
MCV RBC AUTO: 87.1 FL — SIGNIFICANT CHANGE UP (ref 80–100)
PLATELET # BLD AUTO: 297 K/UL — SIGNIFICANT CHANGE UP (ref 150–400)
POTASSIUM SERPL-MCNC: 4.2 MMOL/L — SIGNIFICANT CHANGE UP (ref 3.5–5.3)
POTASSIUM SERPL-SCNC: 4.2 MMOL/L — SIGNIFICANT CHANGE UP (ref 3.5–5.3)
RBC # BLD: 5.12 M/UL — SIGNIFICANT CHANGE UP (ref 4.2–5.8)
RBC # FLD: 11.9 % — SIGNIFICANT CHANGE UP (ref 10.3–14.5)
SODIUM SERPL-SCNC: 136 MMOL/L — SIGNIFICANT CHANGE UP (ref 135–145)
WBC # BLD: 8.37 K/UL — SIGNIFICANT CHANGE UP (ref 3.8–10.5)
WBC # FLD AUTO: 8.37 K/UL — SIGNIFICANT CHANGE UP (ref 3.8–10.5)

## 2024-01-24 PROCEDURE — 80048 BASIC METABOLIC PNL TOTAL CA: CPT

## 2024-01-24 PROCEDURE — 85027 COMPLETE CBC AUTOMATED: CPT

## 2024-01-24 PROCEDURE — 93325 DOPPLER ECHO COLOR FLOW MAPG: CPT

## 2024-01-24 PROCEDURE — 83735 ASSAY OF MAGNESIUM: CPT

## 2024-01-24 PROCEDURE — 93312 ECHO TRANSESOPHAGEAL: CPT

## 2024-01-24 PROCEDURE — 93320 DOPPLER ECHO COMPLETE: CPT

## 2024-01-24 PROCEDURE — 93005 ELECTROCARDIOGRAM TRACING: CPT

## 2024-01-24 PROCEDURE — 36415 COLL VENOUS BLD VENIPUNCTURE: CPT

## 2024-01-24 PROCEDURE — 92960 CARDIOVERSION ELECTRIC EXT: CPT

## 2024-01-24 PROCEDURE — 93010 ELECTROCARDIOGRAM REPORT: CPT

## 2024-01-24 RX ORDER — VALSARTAN 80 MG/1
1 TABLET ORAL
Qty: 0 | Refills: 0 | DISCHARGE

## 2024-01-24 RX ORDER — METOPROLOL TARTRATE 50 MG
1 TABLET ORAL
Qty: 0 | Refills: 0 | DISCHARGE

## 2024-01-24 RX ORDER — METOPROLOL TARTRATE 50 MG
1 TABLET ORAL
Qty: 60 | Refills: 0
Start: 2024-01-24 | End: 2024-02-22

## 2024-01-24 RX ORDER — METOPROLOL TARTRATE 50 MG
1 TABLET ORAL
Refills: 0 | DISCHARGE

## 2024-01-24 RX ORDER — APIXABAN 2.5 MG/1
1 TABLET, FILM COATED ORAL
Refills: 0 | DISCHARGE

## 2024-01-24 RX ORDER — ENOXAPARIN SODIUM 100 MG/ML
80 INJECTION SUBCUTANEOUS ONCE
Refills: 0 | Status: COMPLETED | OUTPATIENT
Start: 2024-01-24 | End: 2024-01-24

## 2024-01-24 RX ADMIN — ENOXAPARIN SODIUM 80 MILLIGRAM(S): 100 INJECTION SUBCUTANEOUS at 09:29

## 2024-01-24 NOTE — DISCHARGE NOTE PROVIDER - NSDCMRMEDTOKEN_GEN_ALL_CORE_FT
ciprofloxacin 500 mg oral tablet: 1 tab(s) orally every 12 hours MDD: 2 tablets  finasteride 5 mg oral tablet: 1 tab(s) orally once a day  fluconazole 200 mg oral tablet: 1 tab(s) orally once a day MDD: 1 tablet  metoprolol tartrate 100 mg oral tablet: 1 tab(s) orally 2 times a day  tamsulosin 0.4 mg oral capsule: 1 cap(s) orally once a day (at bedtime)  valsartan 320 mg oral tablet: 1 tab(s) orally once a day   Eliquis 5 mg oral tablet: 1 tab(s) orally 2 times a day  metoprolol tartrate 100 mg oral tablet: 1 tab(s) orally 2 times a day  valsartan 320 mg oral tablet: 1 tab(s) orally once a day

## 2024-01-24 NOTE — DISCHARGE NOTE PROVIDER - CARE PROVIDER_API CALL
Chucho Tatum  Cardiovascular Disease  [address] [place] [id number]  Phone: [phone]  Fax: [phone]  Follow Up Time: Routine   Chucho Tatum  Cardiovascular Disease  [address] [place] [id number]  Phone: [phone]  Fax: [phone]  Follow Up Time: Routine    [place]  [facility]  [address] [place] [address]  Phone: [phone]  Fax: [phone]  Follow Up Time: Routine

## 2024-01-24 NOTE — DISCHARGE NOTE NURSING/CASE MANAGEMENT/SOCIAL WORK - PATIENT PORTAL LINK FT
You can access the FollowMyHealth Patient Portal offered by [facility] by registering at the following website: [email or web address]. By joining [facility]’s FollowMyHealth portal, you will also be able to view your health information using other applications (apps) compatible with our system.

## 2024-01-24 NOTE — DISCHARGE NOTE PROVIDER - HOSPITAL COURSE
68 year old male patient with a known history of HTN, BPH, and paroxysmal atrial fibrillation (CHADSVASC: 2 currently) who presented for KEVAN/DCCV. Two attempts of DCCV were unsuccessful for restoration of SR.

## 2024-01-24 NOTE — DISCHARGE NOTE PROVIDER - CARE PROVIDERS DIRECT ADDRESSES
[email or web address]

## 2024-01-24 NOTE — DISCHARGE NOTE PROVIDER - PROVIDER TOKENS
PROVIDER:[TOKEN:[[id number]],FOLLOWUP:[Routine]] PROVIDER:[TOKEN:[[id number]],FOLLOWUP:[Routine]],PROVIDER:[TOKEN:[[id number]],FOLLOWUP:[Routine]]

## 2024-01-24 NOTE — DISCHARGE NOTE PROVIDER - NSDCCPTREATMENT_GEN_ALL_CORE_FT
PRINCIPAL PROCEDURE  Procedure: Cardioversion, with KEVAN if indicated  Findings and Treatment: -Take each dose of your anticoagulation medication (blood thinner) exactly as prescribed.   -Resume your diet with soft foods first.  - Do not drive, operate heavy machinery or make important decisions for 24 hours following the procedure.  - You may resume all other activities the day after the procedure.  Call your doctor if:   -you develop a fever, cough up blood, have any chest pain, palpitations or difficulty breathing. You may take a throat lozenge if you develop a sore throat or try warm salt water gargle.   - you have any questions or concerns regarding the procedure.  If you experience increased difficulty breathing or chest pain, or if you faint, have dizzy spells, or for any other distressing symptom, please seek immediate medical attention.

## 2024-01-24 NOTE — H&P PST ADULT - ASSESSMENT
68 year old male patient with a known history of HTN, BPH, and paroxysmal atrial fibrillation (CHADSVASC: 2 currently) who presents in fasting state for KEVAN guided DCCV. Has missed two doses of Eliquis thus far - evening dose and AM dose    - Stat Lovenox ordered  - Will proceed with KEVAN guided DCCV. Reinforced compliance with medications upon discharge.

## 2024-01-24 NOTE — PROGRESS NOTE ADULT - SUBJECTIVE AND OBJECTIVE BOX
ELECTROPHYSIOLOGY BRIEF POST-OP NOTE    I have personally seen and examined the patient today in cath lab holding post attempted KEVAN/DCCV    PRE-OP DIAGNOSIS: Persistent AFib    POST-OP DIAGNOSIS: same    PROCEDURE: KEVAN followed by unsuccessful DCCV x 2    Physician: Dr. Tatum    ESTIMATED BLOOD LOSS: None    ANESTHESIA TYPE:  [  ]General Anesthesia  [ x ]Sedation  [  ]Local/Regional    CONDITION:  [  ]Critical  [  ]Serious  [ x ]Stable  [  ]Good    FINDINGS: AFib    Vital Signs   HR: 95   BP: 106/80   RR: 17   SpO2: 98%     Physical Exam:  Constitutional: NAD, AAOx3  Cardiovascular: +S1S2 IRIR   Pulmonary: CTA b/l, unlabored  GI: soft NTND +BS  Extremities: no pedal edema, Neuro: non focal, BRITT x4    A/P  68 year old male patient with a known history of HTN, BPH, and paroxysmal atrial fibrillation (CHADSVASC: 2 currently) who presented for KEVAN/DCCV. Two attempts of DCCV were unsuccessful for restoration of SR.     -Bedrest x 1 hours  -start Eliquis 5mg po Q12hr, starting tonight   -Increased Lopressor to 100mg BID  -Stopped Norvasc  -Discharge home today with outpatient EP follow up to discuss ablation.

## 2024-01-24 NOTE — H&P PST ADULT - HISTORY OF PRESENT ILLNESS
68 year old male patient with a known history of HTN, BPH, and isolated AFib episode after bee sting in 2009 (CHADSVASC: 2 currently) who presented to the outpatient office with intermittent palpitations and EKG findings of AFib. He otherwise denies any chest pain shortness of breath or syncope. N recent hospitalizations and is compliant with medications. After his office visit he was started on Eliquis 5mg PO BID and started on Lopressor 50mg PO TID.      Cardiac Summary:   Exercise stress test: 4/2021:   Echo: 4/2021: mild LVH; normal EF; mild to moderate TR.  68 year old male patient with a known history of HTN, BPH, and isolated AFib episode after bee sting in 2009 (CHADSVASC: 2 currently) who presented to the outpatient office with intermittent palpitations and EKG findings of AFib. He otherwise denies any chest pain shortness of breath or syncope. No recent hospitalizations and is compliant with medications. After his office visit he was started on Eliquis 5mg PO BID and started on Lopressor 50mg PO TID. Patient poorly compliant and has not taken his last two doses of Eliquis. Otherwise denies any acute dyspnea, chest pain, fever or chills.     Cardiac Summary:   Exercise stress test: 4/2021:   Echo: 4/2021: mild LVH; normal EF; mild to moderate TR.

## 2024-01-24 NOTE — DISCHARGE NOTE PROVIDER - NSDCFUADDINST_GEN_ALL_CORE_FT
Follow up with Dr. Tatum and EP at [facility] as an outpatient. The office will call you with an appointment.

## 2024-01-24 NOTE — DISCHARGE NOTE NURSING/CASE MANAGEMENT/SOCIAL WORK - NSDCPEFALRISK_GEN_ALL_CORE
For information on Fall & Injury Prevention, visit: [email or web address] OR  [email or web address] OR  https://www.cdc.gov/steadi/patient.html

## 2024-07-16 NOTE — ED ADULT TRIAGE NOTE - WEIGHT IN KG
72.6
Photo Preface (Leave Blank If You Do Not Want): Photographs were obtained today
Detail Level: Zone

## 2024-08-02 ENCOUNTER — APPOINTMENT (OUTPATIENT)
Dept: UROLOGY | Facility: CLINIC | Age: 69
End: 2024-08-02
Payer: COMMERCIAL

## 2024-08-02 DIAGNOSIS — N40.0 BENIGN PROSTATIC HYPERPLASIA WITHOUT LOWER URINARY TRACT SYMPMS: ICD-10-CM

## 2024-08-02 PROCEDURE — 99213 OFFICE O/P EST LOW 20 MIN: CPT | Mod: 25

## 2024-08-02 PROCEDURE — 51798 US URINE CAPACITY MEASURE: CPT

## 2024-08-02 NOTE — PHYSICAL EXAM
[General Appearance - In No Acute Distress] : no acute distress [] : no respiratory distress [Abdomen Soft] : soft [Abdomen Tenderness] : non-tender

## 2024-08-06 NOTE — ASSESSMENT
[FreeTextEntry1] : Plan -Check PSA. Update with results over the phone  -One year follow up    I performed history/review of imaging, discussed treatment plan with patient, agree with above transcription by the FRANCISCO J

## 2024-08-06 NOTE — HISTORY OF PRESENT ILLNESS
[FreeTextEntry1] : 69 y/o M here for follow up  S/p LEP 4/2023 Pt states urinating has been perfect Denies leakage, dysuria, urgency, frequency, straining, retention, hematuria, pelvic/abdominal pressure   PVR today 6 ml  Plan -Check PSA. Update with results over phone  -One year follow up

## 2024-08-09 LAB
PSA FREE FLD-MCNC: 29 %
PSA FREE SERPL-MCNC: 0.17 NG/ML
PSA SERPL-MCNC: 0.58 NG/ML

## 2024-08-13 ENCOUNTER — NON-APPOINTMENT (OUTPATIENT)
Age: 69
End: 2024-08-13

## 2024-08-20 ENCOUNTER — APPOINTMENT (OUTPATIENT)
Dept: MRI IMAGING | Facility: CLINIC | Age: 69
End: 2024-08-20
Payer: COMMERCIAL

## 2024-08-20 ENCOUNTER — OUTPATIENT (OUTPATIENT)
Dept: OUTPATIENT SERVICES | Facility: HOSPITAL | Age: 69
LOS: 1 days | End: 2024-08-20

## 2024-08-20 DIAGNOSIS — Z00.00 ENCOUNTER FOR GENERAL ADULT MEDICAL EXAMINATION WITHOUT ABNORMAL FINDINGS: ICD-10-CM

## 2024-08-20 DIAGNOSIS — Z98.890 OTHER SPECIFIED POSTPROCEDURAL STATES: Chronic | ICD-10-CM

## 2024-08-20 PROCEDURE — 72141 MRI NECK SPINE W/O DYE: CPT | Mod: 26

## 2024-09-11 NOTE — ED PROVIDER NOTE - NSFOLLOWUPINSTRUCTIONS_ED_ALL_ED_FT
0 - Follow up with Dr. Anne tomorrow in the office for your cystoscopy as scheduled.  - Return to the ED if you are having no output from your catheter - if you are bleeding a large amount with symptoms of weakness and passing out, or any other new or concerning reason.

## 2024-11-12 ENCOUNTER — OUTPATIENT (OUTPATIENT)
Dept: OUTPATIENT SERVICES | Facility: HOSPITAL | Age: 69
LOS: 1 days | End: 2024-11-12

## 2024-11-12 ENCOUNTER — APPOINTMENT (OUTPATIENT)
Dept: MRI IMAGING | Facility: CLINIC | Age: 69
End: 2024-11-12
Payer: COMMERCIAL

## 2024-11-12 DIAGNOSIS — Z00.8 ENCOUNTER FOR OTHER GENERAL EXAMINATION: ICD-10-CM

## 2024-11-12 PROCEDURE — 70551 MRI BRAIN STEM W/O DYE: CPT | Mod: 26

## 2024-12-02 ENCOUNTER — OUTPATIENT (OUTPATIENT)
Dept: OUTPATIENT SERVICES | Facility: HOSPITAL | Age: 69
LOS: 1 days | End: 2024-12-02

## 2024-12-02 ENCOUNTER — APPOINTMENT (OUTPATIENT)
Dept: NUCLEAR MEDICINE | Facility: CLINIC | Age: 69
End: 2024-12-02
Payer: MEDICARE

## 2024-12-02 DIAGNOSIS — Z98.890 OTHER SPECIFIED POSTPROCEDURAL STATES: Chronic | ICD-10-CM

## 2024-12-02 DIAGNOSIS — R25.1 TREMOR, UNSPECIFIED: ICD-10-CM

## 2024-12-02 PROCEDURE — 78803 RP LOCLZJ TUM SPECT 1 AREA: CPT | Mod: 26

## 2025-02-14 ENCOUNTER — APPOINTMENT (OUTPATIENT)
Dept: UROLOGY | Facility: CLINIC | Age: 70
End: 2025-02-14
Payer: MEDICARE

## 2025-02-14 DIAGNOSIS — N40.0 BENIGN PROSTATIC HYPERPLASIA WITHOUT LOWER URINARY TRACT SYMPMS: ICD-10-CM

## 2025-02-14 PROCEDURE — 99203 OFFICE O/P NEW LOW 30 MIN: CPT

## 2025-02-25 ENCOUNTER — OFFICE (OUTPATIENT)
Dept: URBAN - METROPOLITAN AREA CLINIC 114 | Facility: CLINIC | Age: 70
Setting detail: OPHTHALMOLOGY
End: 2025-02-25
Payer: MEDICARE

## 2025-02-25 DIAGNOSIS — D31.31: ICD-10-CM

## 2025-02-25 DIAGNOSIS — H25.13: ICD-10-CM

## 2025-02-25 DIAGNOSIS — H40.013: ICD-10-CM

## 2025-02-25 DIAGNOSIS — H35.033: ICD-10-CM

## 2025-02-25 PROCEDURE — 92250 FUNDUS PHOTOGRAPHY W/I&R: CPT | Performed by: OPHTHALMOLOGY

## 2025-02-25 PROCEDURE — 99214 OFFICE O/P EST MOD 30 MIN: CPT | Performed by: OPHTHALMOLOGY

## 2025-02-25 ASSESSMENT — PACHYMETRY
OD_CT_CORRECTION: -4
OS_CT_UM: 582
OD_CT_UM: 594
OS_CT_CORRECTION: -3

## 2025-02-25 ASSESSMENT — TONOMETRY
OS_IOP_MMHG: 20
OD_IOP_MMHG: 21

## 2025-02-25 ASSESSMENT — CONFRONTATIONAL VISUAL FIELD TEST (CVF)
OS_FINDINGS: FULL
OD_FINDINGS: FULL

## 2025-02-26 ASSESSMENT — REFRACTION_CURRENTRX
OD_CYLINDER: -0.25
OD_VPRISM_DIRECTION: PROGS
OS_OVR_VA: 20/
OD_ADD: +1.75
OD_AXIS: 096
OS_CYLINDER: 0.00
OD_SPHERE: +2.00
OS_ADD: +1.75
OS_AXIS: 000
OD_ADD: +2.75
OD_OVR_VA: 20/
OS_OVR_VA: 20/
OD_OVR_VA: 20/
OS_SPHERE: +2.50
OS_ADD: +2.75
OD_SPHERE: +2.00
OS_SPHERE: +2.00
OS_VPRISM_DIRECTION: PROGS

## 2025-02-26 ASSESSMENT — REFRACTION_AUTOREFRACTION
OS_SPHERE: +3.00
OS_AXIS: 90
OD_CYLINDER: -1.25
OD_SPHERE: +3.25
OD_AXIS: 080
OS_CYLINDER: -0.75

## 2025-02-26 ASSESSMENT — VISUAL ACUITY
OD_BCVA: 20/40-1
OS_BCVA: 20/30-1

## 2025-02-26 ASSESSMENT — KERATOMETRY
OD_K2POWER_DIOPTERS: 42.75
OD_K1POWER_DIOPTERS: 42.50
OD_AXISANGLE_DEGREES: 171
OS_K1POWER_DIOPTERS: 43.00
OS_AXISANGLE_DEGREES: 143
OS_K2POWER_DIOPTERS: 43.25

## 2025-03-07 ENCOUNTER — NON-APPOINTMENT (OUTPATIENT)
Age: 70
End: 2025-03-07

## 2025-08-01 ENCOUNTER — APPOINTMENT (OUTPATIENT)
Dept: UROLOGY | Facility: CLINIC | Age: 70
End: 2025-08-01

## (undated) DEVICE — TUBING TUR 2 PRONG

## (undated) DEVICE — ELCTR PLASMA BUTTON OVAL 24FR 12-30 DEG

## (undated) DEVICE — FOLEY CATH 3-WAY 22FR 30CC LATEX LUBRICATH

## (undated) DEVICE — Device

## (undated) DEVICE — VENODYNE/SCD SLEEVE CALF MEDIUM

## (undated) DEVICE — SYR LUER LOK 50CC

## (undated) DEVICE — GLV 7.5 PROTEXIS (WHITE)

## (undated) DEVICE — ELCTR PLASMA LOOP MEDIUM 24FR 12-30 DEG

## (undated) DEVICE — TUBING LEVEL ONE NORMOFLO SET

## (undated) DEVICE — SYR LUER LOK 30CC